# Patient Record
Sex: FEMALE | Race: WHITE | NOT HISPANIC OR LATINO | Employment: OTHER | URBAN - METROPOLITAN AREA
[De-identification: names, ages, dates, MRNs, and addresses within clinical notes are randomized per-mention and may not be internally consistent; named-entity substitution may affect disease eponyms.]

---

## 2023-11-16 ENCOUNTER — HOSPITAL ENCOUNTER (OUTPATIENT)
Dept: RADIOLOGY | Facility: HOSPITAL | Age: 78
Discharge: HOME | End: 2023-11-16
Payer: MEDICARE

## 2023-11-16 DIAGNOSIS — S83.231A COMPLEX TEAR OF MEDIAL MENISCUS, CURRENT INJURY, RIGHT KNEE, INITIAL ENCOUNTER: ICD-10-CM

## 2023-11-16 PROCEDURE — 73721 MRI JNT OF LWR EXTRE W/O DYE: CPT

## 2024-02-21 ENCOUNTER — PRE-ADMISSION TESTING (OUTPATIENT)
Dept: PREADMISSION TESTING | Facility: HOSPITAL | Age: 79
End: 2024-02-21
Payer: MEDICARE

## 2024-02-21 VITALS
HEART RATE: 75 BPM | TEMPERATURE: 96.8 F | OXYGEN SATURATION: 100 % | DIASTOLIC BLOOD PRESSURE: 86 MMHG | WEIGHT: 200 LBS | SYSTOLIC BLOOD PRESSURE: 133 MMHG | RESPIRATION RATE: 16 BRPM | HEIGHT: 67 IN | BODY MASS INDEX: 31.39 KG/M2

## 2024-02-21 DIAGNOSIS — Z01.818 PRE-OP TESTING: Primary | ICD-10-CM

## 2024-02-21 LAB
ANION GAP SERPL CALC-SCNC: 9 MMOL/L
APPEARANCE UR: CLEAR
BASOPHILS # BLD AUTO: 0.04 X10*3/UL (ref 0–0.1)
BASOPHILS NFR BLD AUTO: 0.5 %
BILIRUB UR STRIP.AUTO-MCNC: NEGATIVE MG/DL
BUN SERPL-MCNC: 23 MG/DL (ref 8–25)
CALCIUM SERPL-MCNC: 9.4 MG/DL (ref 8.5–10.4)
CHLORIDE SERPL-SCNC: 104 MMOL/L (ref 97–107)
CO2 SERPL-SCNC: 25 MMOL/L (ref 24–31)
COLOR UR: NORMAL
CREAT SERPL-MCNC: 1 MG/DL (ref 0.4–1.6)
EGFRCR SERPLBLD CKD-EPI 2021: 57 ML/MIN/1.73M*2
EOSINOPHIL # BLD AUTO: 0.17 X10*3/UL (ref 0–0.4)
EOSINOPHIL NFR BLD AUTO: 2.2 %
ERYTHROCYTE [DISTWIDTH] IN BLOOD BY AUTOMATED COUNT: 13.6 % (ref 11.5–14.5)
GLUCOSE SERPL-MCNC: 102 MG/DL (ref 65–99)
GLUCOSE UR STRIP.AUTO-MCNC: NORMAL MG/DL
HCT VFR BLD AUTO: 44.3 % (ref 36–46)
HGB BLD-MCNC: 14.4 G/DL (ref 12–16)
IMM GRANULOCYTES # BLD AUTO: 0.01 X10*3/UL (ref 0–0.5)
IMM GRANULOCYTES NFR BLD AUTO: 0.1 % (ref 0–0.9)
KETONES UR STRIP.AUTO-MCNC: NEGATIVE MG/DL
LEUKOCYTE ESTERASE UR QL STRIP.AUTO: NEGATIVE
LYMPHOCYTES # BLD AUTO: 3.22 X10*3/UL (ref 0.8–3)
LYMPHOCYTES NFR BLD AUTO: 41.8 %
MCH RBC QN AUTO: 28.1 PG (ref 26–34)
MCHC RBC AUTO-ENTMCNC: 32.5 G/DL (ref 32–36)
MCV RBC AUTO: 87 FL (ref 80–100)
MONOCYTES # BLD AUTO: 0.76 X10*3/UL (ref 0.05–0.8)
MONOCYTES NFR BLD AUTO: 9.9 %
NEUTROPHILS # BLD AUTO: 3.5 X10*3/UL (ref 1.6–5.5)
NEUTROPHILS NFR BLD AUTO: 45.5 %
NITRITE UR QL STRIP.AUTO: NEGATIVE
NRBC BLD-RTO: 0 /100 WBCS (ref 0–0)
PH UR STRIP.AUTO: 5 [PH]
PLATELET # BLD AUTO: 205 X10*3/UL (ref 150–450)
POTASSIUM SERPL-SCNC: 4.2 MMOL/L (ref 3.4–5.1)
PROT UR STRIP.AUTO-MCNC: NEGATIVE MG/DL
RBC # BLD AUTO: 5.12 X10*6/UL (ref 4–5.2)
RBC # UR STRIP.AUTO: NEGATIVE /UL
SODIUM SERPL-SCNC: 138 MMOL/L (ref 133–145)
SP GR UR STRIP.AUTO: 1.03
UROBILINOGEN UR STRIP.AUTO-MCNC: NORMAL MG/DL
WBC # BLD AUTO: 7.7 X10*3/UL (ref 4.4–11.3)

## 2024-02-21 PROCEDURE — 85025 COMPLETE CBC W/AUTO DIFF WBC: CPT | Performed by: PHYSICIAN ASSISTANT

## 2024-02-21 PROCEDURE — 80048 BASIC METABOLIC PNL TOTAL CA: CPT | Performed by: PHYSICIAN ASSISTANT

## 2024-02-21 PROCEDURE — 81003 URINALYSIS AUTO W/O SCOPE: CPT | Performed by: PHYSICIAN ASSISTANT

## 2024-02-21 PROCEDURE — 99204 OFFICE O/P NEW MOD 45 MIN: CPT | Performed by: PHYSICIAN ASSISTANT

## 2024-02-21 PROCEDURE — 36415 COLL VENOUS BLD VENIPUNCTURE: CPT

## 2024-02-21 PROCEDURE — 87081 CULTURE SCREEN ONLY: CPT | Mod: TRILAB,WESLAB | Performed by: PHYSICIAN ASSISTANT

## 2024-02-21 RX ORDER — CHLORHEXIDINE GLUCONATE ORAL RINSE 1.2 MG/ML
SOLUTION DENTAL
Qty: 473 ML | Refills: 0 | Status: SHIPPED | OUTPATIENT
Start: 2024-02-25 | End: 2024-02-27 | Stop reason: HOSPADM

## 2024-02-21 ASSESSMENT — DUKE ACTIVITY SCORE INDEX (DASI)
TOTAL_SCORE: 42.7
CAN YOU PARTICIPATE IN STRENOUS SPORTS LIKE SWIMMING, SINGLES TENNIS, FOOTBALL, BASKETBALL, OR SKIING: NO
CAN YOU WALK INDOORS, SUCH AS AROUND YOUR HOUSE: YES
CAN YOU TAKE CARE OF YOURSELF (EAT, DRESS, BATHE, OR USE TOILET): YES
CAN YOU DO HEAVY WORK AROUND THE HOUSE LIKE SCRUBBING FLOORS OR LIFTING AND MOVING HEAVY FURNITURE: NO
CAN YOU DO LIGHT WORK AROUND THE HOUSE LIKE DUSTING OR WASHING DISHES: YES
CAN YOU WALK A BLOCK OR TWO ON LEVEL GROUND: YES
CAN YOU HAVE SEXUAL RELATIONS: YES
CAN YOU CLIMB A FLIGHT OF STAIRS OR WALK UP A HILL: YES
CAN YOU PARTICIPATE IN MODERATE RECREATIONAL ACTIVITIES LIKE GOLF, BOWLING, DANCING, DOUBLES TENNIS OR THROWING A BASEBALL OR FOOTBALL: YES
CAN YOU DO MODERATE WORK AROUND THE HOUSE LIKE VACUUMING, SWEEPING FLOORS OR CARRYING GROCERIES: YES
DASI METS SCORE: 8
CAN YOU DO YARD WORK LIKE RAKING LEAVES, WEEDING OR PUSHING A MOWER: YES
CAN YOU RUN A SHORT DISTANCE: YES

## 2024-02-21 ASSESSMENT — CHADS2 SCORE
HYPERTENSION: NO
DIABETES: NO
CHADS2 SCORE: 1
AGE GREATER THAN OR EQUAL TO 75: YES
CHF: NO
PRIOR STROKE OR TIA OR THROMBOEMBOLISM: NO

## 2024-02-21 ASSESSMENT — ENCOUNTER SYMPTOMS
NEUROLOGICAL NEGATIVE: 1
CARDIOVASCULAR NEGATIVE: 1
SHORTNESS OF BREATH: 1
GASTROINTESTINAL NEGATIVE: 1
ENDOCRINE NEGATIVE: 1
CONSTITUTIONAL NEGATIVE: 1
EYES NEGATIVE: 1

## 2024-02-21 ASSESSMENT — PAIN - FUNCTIONAL ASSESSMENT: PAIN_FUNCTIONAL_ASSESSMENT: 0-10

## 2024-02-21 ASSESSMENT — PAIN DESCRIPTION - DESCRIPTORS: DESCRIPTORS: ACHING;SHARP

## 2024-02-21 ASSESSMENT — ACTIVITIES OF DAILY LIVING (ADL): EFFECT OF PAIN ON DAILY ACTIVITIES: DECREASES ACTIVITY TOLERANCE

## 2024-02-21 ASSESSMENT — PAIN SCALES - GENERAL: PAINLEVEL_OUTOF10: 5 - MODERATE PAIN

## 2024-02-21 NOTE — H&P (VIEW-ONLY)
"CPM/PAT Evaluation       Name: Johanna Fair (Johanna Fair)  /Age: 1945/78 y.o.     In-Person       Chief Complaint: \"knee pain\"    HPI  The patient is a 78 year old female.  More than 1 year ago she developed right knee pain with rest and exertion.  In  she underwent a right knee arthroscopy with medial meniscectomy.  She did well post operatively for several months.  In  she developed recurrent right knee pain occasionally while at rest and more pronounced with walking, standing and climbing stairs.  The pain radiates to the right lower leg.  She has associated right knee swelling, numbness, tingling, weakness and instability.  She has not had any falls.  She saw Dr. Alvarez in  and a cortisone injection at that time was not helpful.  A right total knee arthroplasty is recommended at this time.    Past Medical History:   Diagnosis Date    Arthritis     Dry eye     GERD (gastroesophageal reflux disease)     PONV (postoperative nausea and vomiting)        Past Surgical History:   Procedure Laterality Date    CATARACT EXTRACTION Bilateral     CHOLECYSTECTOMY      COLONOSCOPY      FOOT SURGERY Left     HIATAL HERNIA REPAIR      HYSTERECTOMY      KNEE ARTHROSCOPY W/ MENISCAL REPAIR Right     2023    UPPER GASTROINTESTINAL ENDOSCOPY       Social History     Tobacco Use    Smoking status: Never    Smokeless tobacco: Never   Substance Use Topics    Alcohol use: Yes     Comment: SELDOM/ 1 DRINK PER MONTH     Social History     Substance and Sexual Activity   Drug Use Never       Allergies   Allergen Reactions    Morphine Palpitations    Tramadol Hallucinations    Nubain [Nalbuphine] Other     sweating     Current Outpatient Medications   Medication Sig Dispense Refill    [START ON 2024] chlorhexidine (Peridex) 0.12 % solution Use as directed - patient may  prescription prior to 2024 Do not start before 2024. 473 mL 0     No current " "facility-administered medications for this visit.     Review of Systems   Constitutional: Negative.    HENT: Negative.     Eyes: Negative.    Respiratory:  Positive for shortness of breath (occasional).    Cardiovascular: Negative.    Gastrointestinal: Negative.    Endocrine: Negative.    Genitourinary: Negative.    Musculoskeletal:         See HPI   Neurological: Negative.      /86   Pulse 75   Temp 36 °C (96.8 °F) (Temporal)   Resp 16   Ht 1.702 m (5' 7\")   Wt 90.7 kg (200 lb)   SpO2 100%   BMI 31.32 kg/m²     Physical Exam  Vitals reviewed.   Constitutional:       Appearance: Normal appearance.   HENT:      Head: Normocephalic and atraumatic.      Mouth/Throat:      Mouth: Mucous membranes are moist.      Pharynx: Oropharynx is clear.   Eyes:      Extraocular Movements: Extraocular movements intact.      Pupils: Pupils are equal, round, and reactive to light.   Cardiovascular:      Rate and Rhythm: Normal rate and regular rhythm.      Heart sounds: Normal heart sounds.   Pulmonary:      Breath sounds: Normal breath sounds.   Abdominal:      General: Bowel sounds are normal.      Palpations: Abdomen is soft.   Musculoskeletal:      Comments: Mild edema and tenderness with palpation right knee.  Crepitus with limited range of motion right knee.   Skin:     General: Skin is warm and dry.   Neurological:      General: No focal deficit present.      Mental Status: She is alert and oriented to person, place, and time.   Psychiatric:         Mood and Affect: Mood normal.         Behavior: Behavior normal.        PAT AIRWAY:   Airway:     Mallampati::  III    TM distance::  >3 FB    Neck ROM::  Full   upper dentures and lower dentures    ASA:  I  DASI RISK SCORE:  42.7  METS SCORE:  8  CHAD2 SCORE:  2.8%  REVISED CARDIAC RISK INDEX:  0.4%  STOP BANG SCORE:   1    EKG 2/21/2024 - done at [facility] - scanned into MEDIA TAB    Assessment and Plan:     Primary osteoarthritis of right knee:  " Right total knee arthroplasty  PCP recommended cardiac clearance - patient saw Dr. Flora Campbell @ [facility] 2/21/2024 - cardiac clearance/Echocardiogram scanned under MEDIA TAB    Keisah Crum PA-C

## 2024-02-21 NOTE — PREPROCEDURE INSTRUCTIONS
PAT DISCHARGE INSTRUCTIONS    Please call the Same Day Surgery (SDS) Department of the hospital where your procedure will be performed after 2:00 PM the day before your surgery. If you are scheduled on a Monday, or a Tuesday following a Monday holiday, you will need to call on the last business day prior to your surgery.    [facility]  [address] [address] [facility] [address], [address]  [phone]    [facility]  [address] [place] [address] [phone]    [facility]  [address]  [place] [id number]  [phone]    Please let your surgeon know if:      You develop any open sores, shingles, burning or painful urination as these may increase your risk of an infection.   You no longer wish to have the surgery.   Any other personal circumstances change that may lead to the need to cancel or defer this surgery-such as being sick or getting admitted to any hospital within one week of your planned procedure.    Your contact details change, such as a change of address or phone number.    Starting now:     Please DO NOT drink alcohol or smoke for 24 hours before surgery. It is well known that quitting smoking can make a huge difference to your health and recovery from surgery. The longer you abstain from smoking, the better your chances of a healthy recovery. If you need help with quitting, call 9-800-QUIT-NOW to be connected to a trained counselor who will discuss the best methods to help you quit.     Before your surgery:    Please stop all supplements 7 days prior to surgery. Or as directed by your surgeon.   Please stop taking NSAID pain medicine such as Advil and Motrin 7 days before surgery.    If you develop any fever, cough, cold, rashes, cuts, scratches, scrapes, urinary symptoms or infection anywhere on your body (including teeth and gums) prior to surgery, please call your surgeon’s office as soon as  possible. This may require treatment to reduce the chance of cancellation on the day of surgery.    The day before your surgery:   DIET- Do not eat or drink anything after midnight the night before surgery, including mints, candy and gum.   Get a good night’s rest.  Use the special soap for bathing if you have been instructed to use one.    Scheduled surgery times may change and you will be notified if this occurs - please check your personal voicemail for any updates.     On the morning of surgery:   Wear comfortable, loose fitting clothes which open in the front. Please do not wear moisturizers, creams, lotions, makeup or perfume.    Please bring with you to surgery:   Photo ID and insurance card   Current list of medicines and allergies   Pacemaker/ Defibrillator/Heart stent cards   CPAP machine and mask    Slings/ splints/ crutches   A copy of your complete advanced directive/DHPOA.    Please do NOT bring with you to surgery:   All jewelry and valuables should be left at home.   Prosthetic devices such as contact lenses, hearing aids, dentures, eyelash extensions, hairpins and body piercings must be removed prior to going in to the surgical suite.    After outpatient surgery:   A responsible adult MUST accompany you at the time of discharge and stay with you for 24 hours after your surgery. You may NOT drive yourself home after surgery.    Do not drive, operate machinery, make critical decisions or do activities that require co-ordination or balance until after a night’s sleep.   Do not drink alcoholic beverages for 24 hours.   Instructions for resuming your medications will be provided by your surgeon.    CALL YOUR DOCTOR AFTER SURGERY IF YOU HAVE:     Chills and/or a fever of 101° F or higher.    Redness, swelling, pus or drainage from your surgical wound or a bad smell from the wound.    Lightheadedness, fainting or confusion.    Persistent vomiting (throwing up) and are not able to eat or drink for 12 hours.     Three or more loose, watery bowel movements in 24 hours (diarrhea).   Difficulty or pain while urinating( after non-urological surgery)    Pain and swelling in your legs, especially if it is only on one side.    Difficulty breathing or are breathing faster than normal.    Any new concerning symptoms.     Home Preoperative Antibacterial Shower    What is a home preoperative antibacterial shower?  This shower is a way of cleaning the skin with a germ killing solution before surgery. The solution contains chlorhexidine, commonly known as CHG. CHG is a skin cleanser with germ killing ability. Let your doctor know if you are allergic to chlorhexidine.      Why do I need to take a preoperative antibacterial shower?  Skin is not sterile. It is best to try to make your skin as free of germs as possible before surgery. Proper cleansing with a germ killing soap before surgery can lower the number of germs on your skin. This helps to reduce the risk of infection at the surgical site. Following the instructions listed below will help you prepare your skin for surgery.    How do I use the solution?      Steps: Begin using your CHG soap FIVE DAYS BEFORE your scheduled surgery on __________________________________________________.  First, wash and rinse your hair using the CHG soap. Keep CHG away soap away from ear canals and eyes.  Rinse completely, do not condition. Hair extensions should be removed.  Wash your face with your normal soap and rinse.  Apply the CHG solution to a clean wet washcloth. Turn the water off or move away from the water spray to avoid premature rinsing of the CHG soap as you are applying.  Firmly lather your entire body from neck down. Do not use on face.  Pay special attention to the areas(s) where your incision(s) will be located unless they are on your face.  Avoid scrubbing your skin too hard.  The important point is to have the CHG soap sit on your skin for 3 minutes.  DO NOT wash with regular  soap after you have used the CHG soap solution.  Pat yourself dry with a clean, freshly laundered towel.  DO NOT apply powders, deodorants or lotions.  Dress in clean, freshly laundered night clothes.  Be sure to sleep with clean, freshly laundered sheets.  Be aware that CHG will cause stains on fabrics; if you wash them with bleach after use. Rinse your washcloth and other linens that have contact with CHG completely. Use only non-chlorine detergents to launder the items used.  The morning of surgery is the fifth day. Repeat the above steps and dress in clean comfortable clothing.      Who should I call if I have any questions regarding the use of CHG soap?  Call the [facility] at [phone] if you have any questions.       Patient Information: Oral/Dental Rinse    What is oral/dental rinse?  It is a mouthwash. It is a way of cleaning the mouth with a germ killing solution before your surgery. The solution contains chlorhexidine, commonly know as CHG.  It is used inside the mouth to kill bacteria known as Staphylococcus aureus.  Let your doctor know if you are allergic to chlorhexidine.    Why do I need to use CHG oral/dental rinse?  The CHG oral/dental rinse helps to kill bacteria in your mouth known as Staphylococcus aureus. This reduces the risk of infection at the surgical site.    Using your CHG oral/dental rinse.  STEPS: use your CHG oral/dental rinse after you brush your teeth the night before (at bedtime) and the morning of your surgery. Follow the directions on your prescription label.  Use the cap on the container to measure 15ml (fill cap to fill line)  Swish (gargle if you can) the mouthwash in your mouth for at least 30 seconds, (do not swallow) spit out.  After you use your CHG rinse, do not rinse your mouth with water, drink or eat. Please refer to prescription label for the appropriate time to resume oral intake.    What side  effects might I have using the CHG oral/dental rinse?  CHG rinse will stick to the plaque on the teeth. Brush and floss just before use. Teeth brushing will help avoid staining of plaque during use.    Who should I contact if I have questions about the CHG oral/dental rinse?  Please call [facility] at [phone] if you have any questions.     Medication List      as of February 21, 2024  7:35 AM     You have not been prescribed any medications.

## 2024-02-21 NOTE — CPM/PAT H&P
"CPM/PAT Evaluation       Name: Johanna Fair (Johanna Fair)  /Age: 1945/78 y.o.     In-Person       Chief Complaint: \"knee pain\"    HPI  The patient is a 78 year old female.  More than 1 year ago she developed right knee pain with rest and exertion.  In  she underwent a right knee arthroscopy with medial meniscectomy.  She did well post operatively for several months.  In  she developed recurrent right knee pain occasionally while at rest and more pronounced with walking, standing and climbing stairs.  The pain radiates to the right lower leg.  She has associated right knee swelling, numbness, tingling, weakness and instability.  She has not had any falls.  She saw Dr. Alvarez in  and a cortisone injection at that time was not helpful.  A right total knee arthroplasty is recommended at this time.    Past Medical History:   Diagnosis Date    Arthritis     Dry eye     GERD (gastroesophageal reflux disease)     PONV (postoperative nausea and vomiting)        Past Surgical History:   Procedure Laterality Date    CATARACT EXTRACTION Bilateral     CHOLECYSTECTOMY      COLONOSCOPY      FOOT SURGERY Left     HIATAL HERNIA REPAIR      HYSTERECTOMY      KNEE ARTHROSCOPY W/ MENISCAL REPAIR Right     2023    UPPER GASTROINTESTINAL ENDOSCOPY       Social History     Tobacco Use    Smoking status: Never    Smokeless tobacco: Never   Substance Use Topics    Alcohol use: Yes     Comment: SELDOM/ 1 DRINK PER MONTH     Social History     Substance and Sexual Activity   Drug Use Never       Allergies   Allergen Reactions    Morphine Palpitations    Tramadol Hallucinations    Nubain [Nalbuphine] Other     sweating     Current Outpatient Medications   Medication Sig Dispense Refill    [START ON 2024] chlorhexidine (Peridex) 0.12 % solution Use as directed - patient may  prescription prior to 2024 Do not start before 2024. 473 mL 0     No current " "facility-administered medications for this visit.     Review of Systems   Constitutional: Negative.    HENT: Negative.     Eyes: Negative.    Respiratory:  Positive for shortness of breath (occasional).    Cardiovascular: Negative.    Gastrointestinal: Negative.    Endocrine: Negative.    Genitourinary: Negative.    Musculoskeletal:         See HPI   Neurological: Negative.      /86   Pulse 75   Temp 36 °C (96.8 °F) (Temporal)   Resp 16   Ht 1.702 m (5' 7\")   Wt 90.7 kg (200 lb)   SpO2 100%   BMI 31.32 kg/m²     Physical Exam  Vitals reviewed.   Constitutional:       Appearance: Normal appearance.   HENT:      Head: Normocephalic and atraumatic.      Mouth/Throat:      Mouth: Mucous membranes are moist.      Pharynx: Oropharynx is clear.   Eyes:      Extraocular Movements: Extraocular movements intact.      Pupils: Pupils are equal, round, and reactive to light.   Cardiovascular:      Rate and Rhythm: Normal rate and regular rhythm.      Heart sounds: Normal heart sounds.   Pulmonary:      Breath sounds: Normal breath sounds.   Abdominal:      General: Bowel sounds are normal.      Palpations: Abdomen is soft.   Musculoskeletal:      Comments: Mild edema and tenderness with palpation right knee.  Crepitus with limited range of motion right knee.   Skin:     General: Skin is warm and dry.   Neurological:      General: No focal deficit present.      Mental Status: She is alert and oriented to person, place, and time.   Psychiatric:         Mood and Affect: Mood normal.         Behavior: Behavior normal.        PAT AIRWAY:   Airway:     Mallampati::  III    TM distance::  >3 FB    Neck ROM::  Full   upper dentures and lower dentures    ASA:  I  DASI RISK SCORE:  42.7  METS SCORE:  8  CHAD2 SCORE:  2.8%  REVISED CARDIAC RISK INDEX:  0.4%  STOP BANG SCORE:   1    EKG 2/21/2024 - done at [facility] - scanned into MEDIA TAB    Assessment and Plan:     Primary osteoarthritis of right knee:  " Right total knee arthroplasty  PCP recommended cardiac clearance - patient saw Dr. Flora Campbell @ [facility] 2/21/2024 - cardiac clearance/Echocardiogram scanned under MEDIA TAB    Keisha Crum PA-C

## 2024-02-23 ENCOUNTER — ANESTHESIA EVENT (OUTPATIENT)
Dept: OPERATING ROOM | Facility: HOSPITAL | Age: 79
End: 2024-02-23
Payer: MEDICARE

## 2024-02-23 PROBLEM — Z96.651 S/P TOTAL KNEE ARTHROPLASTY, RIGHT: Status: ACTIVE | Noted: 2024-02-23

## 2024-02-23 LAB — STAPHYLOCOCCUS SPEC CULT: NORMAL

## 2024-02-26 ENCOUNTER — HOSPITAL ENCOUNTER (OUTPATIENT)
Facility: HOSPITAL | Age: 79
LOS: 1 days | Discharge: HOME | End: 2024-02-27
Attending: STUDENT IN AN ORGANIZED HEALTH CARE EDUCATION/TRAINING PROGRAM | Admitting: STUDENT IN AN ORGANIZED HEALTH CARE EDUCATION/TRAINING PROGRAM
Payer: MEDICARE

## 2024-02-26 ENCOUNTER — ANESTHESIA (OUTPATIENT)
Dept: OPERATING ROOM | Facility: HOSPITAL | Age: 79
End: 2024-02-26
Payer: MEDICARE

## 2024-02-26 ENCOUNTER — APPOINTMENT (OUTPATIENT)
Dept: RADIOLOGY | Facility: HOSPITAL | Age: 79
End: 2024-02-26
Payer: MEDICARE

## 2024-02-26 DIAGNOSIS — Z96.651 S/P TOTAL KNEE ARTHROPLASTY, RIGHT: Primary | ICD-10-CM

## 2024-02-26 PROBLEM — K21.9 GASTROESOPHAGEAL REFLUX DISEASE WITHOUT ESOPHAGITIS: Status: ACTIVE | Noted: 2024-02-26

## 2024-02-26 PROBLEM — M17.9 OSTEOARTHRITIS OF KNEE: Status: ACTIVE | Noted: 2024-02-26

## 2024-02-26 PROCEDURE — 2500000004 HC RX 250 GENERAL PHARMACY W/ HCPCS (ALT 636 FOR OP/ED): Performed by: ANESTHESIOLOGY

## 2024-02-26 PROCEDURE — 2500000005 HC RX 250 GENERAL PHARMACY W/O HCPCS: Performed by: ANESTHESIOLOGY

## 2024-02-26 PROCEDURE — 3700000001 HC GENERAL ANESTHESIA TIME - INITIAL BASE CHARGE: Performed by: STUDENT IN AN ORGANIZED HEALTH CARE EDUCATION/TRAINING PROGRAM

## 2024-02-26 PROCEDURE — 2500000005 HC RX 250 GENERAL PHARMACY W/O HCPCS: Performed by: STUDENT IN AN ORGANIZED HEALTH CARE EDUCATION/TRAINING PROGRAM

## 2024-02-26 PROCEDURE — A4649 SURGICAL SUPPLIES: HCPCS | Performed by: STUDENT IN AN ORGANIZED HEALTH CARE EDUCATION/TRAINING PROGRAM

## 2024-02-26 PROCEDURE — C9113 INJ PANTOPRAZOLE SODIUM, VIA: HCPCS | Performed by: NURSE PRACTITIONER

## 2024-02-26 PROCEDURE — 2500000001 HC RX 250 WO HCPCS SELF ADMINISTERED DRUGS (ALT 637 FOR MEDICARE OP): Performed by: STUDENT IN AN ORGANIZED HEALTH CARE EDUCATION/TRAINING PROGRAM

## 2024-02-26 PROCEDURE — C1776 JOINT DEVICE (IMPLANTABLE): HCPCS | Performed by: STUDENT IN AN ORGANIZED HEALTH CARE EDUCATION/TRAINING PROGRAM

## 2024-02-26 PROCEDURE — 97116 GAIT TRAINING THERAPY: CPT | Mod: GP

## 2024-02-26 PROCEDURE — 2500000004 HC RX 250 GENERAL PHARMACY W/ HCPCS (ALT 636 FOR OP/ED): Performed by: NURSE PRACTITIONER

## 2024-02-26 PROCEDURE — 7100000011 HC EXTENDED STAY RECOVERY HOURLY - NURSING UNIT

## 2024-02-26 PROCEDURE — 2500000004 HC RX 250 GENERAL PHARMACY W/ HCPCS (ALT 636 FOR OP/ED): Performed by: STUDENT IN AN ORGANIZED HEALTH CARE EDUCATION/TRAINING PROGRAM

## 2024-02-26 PROCEDURE — 7100000002 HC RECOVERY ROOM TIME - EACH INCREMENTAL 1 MINUTE: Performed by: STUDENT IN AN ORGANIZED HEALTH CARE EDUCATION/TRAINING PROGRAM

## 2024-02-26 PROCEDURE — 2780000003 HC OR 278 NO HCPCS: Performed by: STUDENT IN AN ORGANIZED HEALTH CARE EDUCATION/TRAINING PROGRAM

## 2024-02-26 PROCEDURE — 2720000007 HC OR 272 NO HCPCS: Performed by: STUDENT IN AN ORGANIZED HEALTH CARE EDUCATION/TRAINING PROGRAM

## 2024-02-26 PROCEDURE — 3700000002 HC GENERAL ANESTHESIA TIME - EACH INCREMENTAL 1 MINUTE: Performed by: STUDENT IN AN ORGANIZED HEALTH CARE EDUCATION/TRAINING PROGRAM

## 2024-02-26 PROCEDURE — C1713 ANCHOR/SCREW BN/BN,TIS/BN: HCPCS | Performed by: STUDENT IN AN ORGANIZED HEALTH CARE EDUCATION/TRAINING PROGRAM

## 2024-02-26 PROCEDURE — 3600000017 HC OR TIME - EACH INCREMENTAL 1 MINUTE - PROCEDURE LEVEL SIX: Performed by: STUDENT IN AN ORGANIZED HEALTH CARE EDUCATION/TRAINING PROGRAM

## 2024-02-26 PROCEDURE — 97166 OT EVAL MOD COMPLEX 45 MIN: CPT | Mod: GO

## 2024-02-26 PROCEDURE — 97162 PT EVAL MOD COMPLEX 30 MIN: CPT | Mod: GP

## 2024-02-26 PROCEDURE — 7100000001 HC RECOVERY ROOM TIME - INITIAL BASE CHARGE: Performed by: STUDENT IN AN ORGANIZED HEALTH CARE EDUCATION/TRAINING PROGRAM

## 2024-02-26 PROCEDURE — 3600000018 HC OR TIME - INITIAL BASE CHARGE - PROCEDURE LEVEL SIX: Performed by: STUDENT IN AN ORGANIZED HEALTH CARE EDUCATION/TRAINING PROGRAM

## 2024-02-26 PROCEDURE — 73560 X-RAY EXAM OF KNEE 1 OR 2: CPT | Mod: RT

## 2024-02-26 DEVICE — SIMPLEX® HV IS A FAST-SETTING ACRYLIC RESIN FOR USE IN BONE SURGERY. MIXING THE TWO SEPARATE STERILE COMPONENTS PRODUCES A DUCTILE BONE CEMENT WHICH, AFTER HARDENING, FIXES THE IMPLANT AND TRANSFERS STRESSES PRODUCED DURING MOVEMENT EVENLY TO THE BONE. SIMPLEX® HV CEMENT POWDER ALSO CONTAINS INSOLUBLE ZIRCONIUM DIOXIDE AS AN X-RAY CONTRAST MEDIUM. SIMPLEX® HV DOES NOT EMIT A SIGNAL AND DOES NOT POSE A SAFETY RISK IN A MAGNETIC RESONANCE ENVIRONMENT.
Type: IMPLANTABLE DEVICE | Site: KNEE | Status: FUNCTIONAL
Brand: SIMPLEX HV

## 2024-02-26 DEVICE — ASYMMETRIC PATELLA
Type: IMPLANTABLE DEVICE | Site: KNEE | Status: FUNCTIONAL
Brand: TRIATHLON

## 2024-02-26 DEVICE — POSTERIOR STABILIZED FEMORAL
Type: IMPLANTABLE DEVICE | Site: KNEE | Status: FUNCTIONAL
Brand: TRIATHLON

## 2024-02-26 DEVICE — UNIVERSAL TIBIAL BASEPLATE
Type: IMPLANTABLE DEVICE | Site: KNEE | Status: FUNCTIONAL
Brand: TRIATHLON

## 2024-02-26 DEVICE — FEMORAL DISTAL FIXATION PEG
Type: IMPLANTABLE DEVICE | Site: KNEE | Status: FUNCTIONAL
Brand: TRIATHLON

## 2024-02-26 DEVICE — IMPLANTABLE DEVICE: Type: IMPLANTABLE DEVICE | Site: KNEE | Status: FUNCTIONAL

## 2024-02-26 RX ORDER — DEXAMETHASONE SODIUM PHOSPHATE 4 MG/ML
INJECTION, SOLUTION INTRA-ARTICULAR; INTRALESIONAL; INTRAMUSCULAR; INTRAVENOUS; SOFT TISSUE AS NEEDED
Status: DISCONTINUED | OUTPATIENT
Start: 2024-02-26 | End: 2024-02-26

## 2024-02-26 RX ORDER — LIDOCAINE HYDROCHLORIDE 10 MG/ML
0.1 INJECTION INFILTRATION; PERINEURAL ONCE
Status: DISCONTINUED | OUTPATIENT
Start: 2024-02-26 | End: 2024-02-26 | Stop reason: HOSPADM

## 2024-02-26 RX ORDER — LIDOCAINE HYDROCHLORIDE 20 MG/ML
INJECTION, SOLUTION INFILTRATION; PERINEURAL AS NEEDED
Status: DISCONTINUED | OUTPATIENT
Start: 2024-02-26 | End: 2024-02-26

## 2024-02-26 RX ORDER — ONDANSETRON HYDROCHLORIDE 2 MG/ML
4 INJECTION, SOLUTION INTRAVENOUS ONCE AS NEEDED
Status: DISCONTINUED | OUTPATIENT
Start: 2024-02-26 | End: 2024-02-26 | Stop reason: HOSPADM

## 2024-02-26 RX ORDER — HYDROCODONE BITARTRATE AND ACETAMINOPHEN 5; 325 MG/1; MG/1
2 TABLET ORAL EVERY 4 HOURS PRN
Status: DISCONTINUED | OUTPATIENT
Start: 2024-02-26 | End: 2024-02-27 | Stop reason: HOSPADM

## 2024-02-26 RX ORDER — NALOXONE HYDROCHLORIDE 0.4 MG/ML
0.2 INJECTION, SOLUTION INTRAMUSCULAR; INTRAVENOUS; SUBCUTANEOUS EVERY 5 MIN PRN
Status: DISCONTINUED | OUTPATIENT
Start: 2024-02-26 | End: 2024-02-27 | Stop reason: HOSPADM

## 2024-02-26 RX ORDER — ALBUTEROL SULFATE 0.83 MG/ML
2.5 SOLUTION RESPIRATORY (INHALATION) ONCE AS NEEDED
Status: DISCONTINUED | OUTPATIENT
Start: 2024-02-26 | End: 2024-02-26 | Stop reason: HOSPADM

## 2024-02-26 RX ORDER — HYDROMORPHONE HYDROCHLORIDE 1 MG/ML
INJECTION, SOLUTION INTRAMUSCULAR; INTRAVENOUS; SUBCUTANEOUS AS NEEDED
Status: DISCONTINUED | OUTPATIENT
Start: 2024-02-26 | End: 2024-02-26

## 2024-02-26 RX ORDER — CEFAZOLIN SODIUM 2 G/100ML
2 INJECTION, SOLUTION INTRAVENOUS EVERY 8 HOURS
Status: COMPLETED | OUTPATIENT
Start: 2024-02-26 | End: 2024-02-27

## 2024-02-26 RX ORDER — ASPIRIN 81 MG/1
81 TABLET ORAL 2 TIMES DAILY
Status: DISCONTINUED | OUTPATIENT
Start: 2024-02-26 | End: 2024-02-27 | Stop reason: HOSPADM

## 2024-02-26 RX ORDER — POLYETHYLENE GLYCOL 3350 17 G/17G
17 POWDER, FOR SOLUTION ORAL DAILY
Status: DISCONTINUED | OUTPATIENT
Start: 2024-02-26 | End: 2024-02-27 | Stop reason: HOSPADM

## 2024-02-26 RX ORDER — FENTANYL CITRATE 50 UG/ML
INJECTION, SOLUTION INTRAMUSCULAR; INTRAVENOUS AS NEEDED
Status: DISCONTINUED | OUTPATIENT
Start: 2024-02-26 | End: 2024-02-26

## 2024-02-26 RX ORDER — METOCLOPRAMIDE HYDROCHLORIDE 5 MG/ML
INJECTION INTRAMUSCULAR; INTRAVENOUS AS NEEDED
Status: DISCONTINUED | OUTPATIENT
Start: 2024-02-26 | End: 2024-02-26

## 2024-02-26 RX ORDER — SODIUM CHLORIDE, SODIUM LACTATE, POTASSIUM CHLORIDE, CALCIUM CHLORIDE 600; 310; 30; 20 MG/100ML; MG/100ML; MG/100ML; MG/100ML
100 INJECTION, SOLUTION INTRAVENOUS CONTINUOUS
Status: DISCONTINUED | OUTPATIENT
Start: 2024-02-26 | End: 2024-02-27 | Stop reason: HOSPADM

## 2024-02-26 RX ORDER — TRANEXAMIC ACID 10 MG/ML
INJECTION, SOLUTION INTRAVENOUS AS NEEDED
Status: DISCONTINUED | OUTPATIENT
Start: 2024-02-26 | End: 2024-02-26

## 2024-02-26 RX ORDER — SODIUM CHLORIDE, SODIUM LACTATE, POTASSIUM CHLORIDE, CALCIUM CHLORIDE 600; 310; 30; 20 MG/100ML; MG/100ML; MG/100ML; MG/100ML
100 INJECTION, SOLUTION INTRAVENOUS CONTINUOUS
Status: DISCONTINUED | OUTPATIENT
Start: 2024-02-26 | End: 2024-02-26 | Stop reason: HOSPADM

## 2024-02-26 RX ORDER — SCOLOPAMINE TRANSDERMAL SYSTEM 1 MG/1
1 PATCH, EXTENDED RELEASE TRANSDERMAL ONCE
Status: DISCONTINUED | OUTPATIENT
Start: 2024-02-26 | End: 2024-02-27 | Stop reason: HOSPADM

## 2024-02-26 RX ORDER — CEFAZOLIN SODIUM 2 G/100ML
2 INJECTION, SOLUTION INTRAVENOUS ONCE
Status: COMPLETED | OUTPATIENT
Start: 2024-02-26 | End: 2024-02-26

## 2024-02-26 RX ORDER — PROPOFOL 10 MG/ML
INJECTION, EMULSION INTRAVENOUS AS NEEDED
Status: DISCONTINUED | OUTPATIENT
Start: 2024-02-26 | End: 2024-02-26

## 2024-02-26 RX ORDER — ONDANSETRON HYDROCHLORIDE 2 MG/ML
INJECTION, SOLUTION INTRAVENOUS AS NEEDED
Status: DISCONTINUED | OUTPATIENT
Start: 2024-02-26 | End: 2024-02-26

## 2024-02-26 RX ORDER — VANCOMYCIN HYDROCHLORIDE 1 G/20ML
INJECTION, POWDER, LYOPHILIZED, FOR SOLUTION INTRAVENOUS AS NEEDED
Status: DISCONTINUED | OUTPATIENT
Start: 2024-02-26 | End: 2024-02-26 | Stop reason: HOSPADM

## 2024-02-26 RX ORDER — PANTOPRAZOLE SODIUM 40 MG/10ML
40 INJECTION, POWDER, LYOPHILIZED, FOR SOLUTION INTRAVENOUS DAILY
Status: DISCONTINUED | OUTPATIENT
Start: 2024-02-26 | End: 2024-02-27 | Stop reason: HOSPADM

## 2024-02-26 RX ORDER — HYDRALAZINE HYDROCHLORIDE 20 MG/ML
5 INJECTION INTRAMUSCULAR; INTRAVENOUS EVERY 30 MIN PRN
Status: DISCONTINUED | OUTPATIENT
Start: 2024-02-26 | End: 2024-02-26 | Stop reason: HOSPADM

## 2024-02-26 RX ORDER — SODIUM CHLORIDE 9 MG/ML
100 INJECTION, SOLUTION INTRAVENOUS CONTINUOUS
Status: DISCONTINUED | OUTPATIENT
Start: 2024-02-26 | End: 2024-02-27 | Stop reason: HOSPADM

## 2024-02-26 RX ORDER — ACETAMINOPHEN 325 MG/1
650 TABLET ORAL EVERY 4 HOURS PRN
Status: DISCONTINUED | OUTPATIENT
Start: 2024-02-26 | End: 2024-02-27 | Stop reason: HOSPADM

## 2024-02-26 RX ORDER — ONDANSETRON HYDROCHLORIDE 2 MG/ML
4 INJECTION, SOLUTION INTRAVENOUS EVERY 8 HOURS PRN
Status: DISCONTINUED | OUTPATIENT
Start: 2024-02-26 | End: 2024-02-27 | Stop reason: HOSPADM

## 2024-02-26 RX ORDER — ONDANSETRON 4 MG/1
4 TABLET, ORALLY DISINTEGRATING ORAL EVERY 8 HOURS PRN
Status: DISCONTINUED | OUTPATIENT
Start: 2024-02-26 | End: 2024-02-27 | Stop reason: HOSPADM

## 2024-02-26 RX ORDER — HYDROCODONE BITARTRATE AND ACETAMINOPHEN 5; 325 MG/1; MG/1
1 TABLET ORAL EVERY 4 HOURS PRN
Status: DISCONTINUED | OUTPATIENT
Start: 2024-02-26 | End: 2024-02-27 | Stop reason: HOSPADM

## 2024-02-26 RX ADMIN — ONDANSETRON HYDROCHLORIDE 4 MG: 2 INJECTION INTRAMUSCULAR; INTRAVENOUS at 12:22

## 2024-02-26 RX ADMIN — PROPOFOL 25 MG: 10 INJECTION, EMULSION INTRAVENOUS at 11:21

## 2024-02-26 RX ADMIN — DEXAMETHASONE SODIUM PHOSPHATE 4 MG: 4 INJECTION, SOLUTION INTRA-ARTICULAR; INTRALESIONAL; INTRAMUSCULAR; INTRAVENOUS; SOFT TISSUE at 11:01

## 2024-02-26 RX ADMIN — HYDROMORPHONE HYDROCHLORIDE 0.2 MG: 1 INJECTION, SOLUTION INTRAMUSCULAR; INTRAVENOUS; SUBCUTANEOUS at 13:06

## 2024-02-26 RX ADMIN — HYDROMORPHONE HYDROCHLORIDE 0.2 MG: 1 INJECTION, SOLUTION INTRAMUSCULAR; INTRAVENOUS; SUBCUTANEOUS at 13:13

## 2024-02-26 RX ADMIN — FENTANYL CITRATE 25 MCG: 0.05 INJECTION, SOLUTION INTRAMUSCULAR; INTRAVENOUS at 11:25

## 2024-02-26 RX ADMIN — LIDOCAINE HYDROCHLORIDE 50 MG: 20 INJECTION, SOLUTION INFILTRATION; PERINEURAL at 10:52

## 2024-02-26 RX ADMIN — SODIUM CHLORIDE 100 ML/HR: 900 INJECTION, SOLUTION INTRAVENOUS at 16:11

## 2024-02-26 RX ADMIN — PROPOFOL 25 MG: 10 INJECTION, EMULSION INTRAVENOUS at 12:48

## 2024-02-26 RX ADMIN — Medication 2 L/MIN: at 16:00

## 2024-02-26 RX ADMIN — HYDROCODONE BITARTRATE AND ACETAMINOPHEN 2 TABLET: 5; 325 TABLET ORAL at 17:36

## 2024-02-26 RX ADMIN — SCOPALAMINE 1 PATCH: 1 PATCH, EXTENDED RELEASE TRANSDERMAL at 09:49

## 2024-02-26 RX ADMIN — CEFAZOLIN SODIUM 2 G: 2 INJECTION, SOLUTION INTRAVENOUS at 17:37

## 2024-02-26 RX ADMIN — PANTOPRAZOLE SODIUM 40 MG: 40 INJECTION, POWDER, FOR SOLUTION INTRAVENOUS at 17:37

## 2024-02-26 RX ADMIN — CEFAZOLIN SODIUM 2 G: 2 INJECTION, SOLUTION INTRAVENOUS at 10:43

## 2024-02-26 RX ADMIN — PROPOFOL 25 MG: 10 INJECTION, EMULSION INTRAVENOUS at 12:04

## 2024-02-26 RX ADMIN — FENTANYL CITRATE 50 MCG: 0.05 INJECTION, SOLUTION INTRAMUSCULAR; INTRAVENOUS at 10:52

## 2024-02-26 RX ADMIN — SODIUM CHLORIDE, POTASSIUM CHLORIDE, SODIUM LACTATE AND CALCIUM CHLORIDE: 600; 310; 30; 20 INJECTION, SOLUTION INTRAVENOUS at 10:43

## 2024-02-26 RX ADMIN — PROPOFOL 25 MG: 10 INJECTION, EMULSION INTRAVENOUS at 11:51

## 2024-02-26 RX ADMIN — PROPOFOL 25 MG: 10 INJECTION, EMULSION INTRAVENOUS at 11:34

## 2024-02-26 RX ADMIN — TRANEXAMIC ACID 1000 MG: 10 INJECTION, SOLUTION INTRAVENOUS at 12:19

## 2024-02-26 RX ADMIN — ASPIRIN 81 MG: 81 TABLET, COATED ORAL at 22:21

## 2024-02-26 RX ADMIN — FENTANYL CITRATE 50 MCG: 0.05 INJECTION, SOLUTION INTRAMUSCULAR; INTRAVENOUS at 10:44

## 2024-02-26 RX ADMIN — HYDROCODONE BITARTRATE AND ACETAMINOPHEN 2 TABLET: 5; 325 TABLET ORAL at 22:21

## 2024-02-26 RX ADMIN — HYDROMORPHONE HYDROCHLORIDE 0.4 MG: 1 INJECTION, SOLUTION INTRAMUSCULAR; INTRAVENOUS; SUBCUTANEOUS at 13:16

## 2024-02-26 RX ADMIN — PROPOFOL 25 MG: 10 INJECTION, EMULSION INTRAVENOUS at 12:32

## 2024-02-26 RX ADMIN — PROPOFOL 25 MG: 10 INJECTION, EMULSION INTRAVENOUS at 11:25

## 2024-02-26 RX ADMIN — PROPOFOL 50 MG: 10 INJECTION, EMULSION INTRAVENOUS at 11:04

## 2024-02-26 RX ADMIN — HYDROMORPHONE HYDROCHLORIDE 0.4 MG: 1 INJECTION, SOLUTION INTRAMUSCULAR; INTRAVENOUS; SUBCUTANEOUS at 13:41

## 2024-02-26 RX ADMIN — POLYETHYLENE GLYCOL 3350 17 G: 17 POWDER, FOR SOLUTION ORAL at 16:11

## 2024-02-26 RX ADMIN — PROPOFOL 25 MG: 10 INJECTION, EMULSION INTRAVENOUS at 12:17

## 2024-02-26 RX ADMIN — HYDROMORPHONE HYDROCHLORIDE 0.2 MG: 1 INJECTION, SOLUTION INTRAMUSCULAR; INTRAVENOUS; SUBCUTANEOUS at 13:05

## 2024-02-26 RX ADMIN — PROPOFOL 150 MG: 10 INJECTION, EMULSION INTRAVENOUS at 10:52

## 2024-02-26 RX ADMIN — FENTANYL CITRATE 25 MCG: 0.05 INJECTION, SOLUTION INTRAMUSCULAR; INTRAVENOUS at 12:24

## 2024-02-26 RX ADMIN — HYDROMORPHONE HYDROCHLORIDE 0.4 MG: 1 INJECTION, SOLUTION INTRAMUSCULAR; INTRAVENOUS; SUBCUTANEOUS at 13:59

## 2024-02-26 RX ADMIN — TRANEXAMIC ACID 1000 MG: 10 INJECTION, SOLUTION INTRAVENOUS at 11:00

## 2024-02-26 RX ADMIN — METOCLOPRAMIDE 10 MG: 5 INJECTION, SOLUTION INTRAMUSCULAR; INTRAVENOUS at 11:01

## 2024-02-26 RX ADMIN — FENTANYL CITRATE 50 MCG: 0.05 INJECTION, SOLUTION INTRAMUSCULAR; INTRAVENOUS at 11:04

## 2024-02-26 SDOH — SOCIAL STABILITY: SOCIAL INSECURITY: ARE YOU OR HAVE YOU BEEN THREATENED OR ABUSED PHYSICALLY, EMOTIONALLY, OR SEXUALLY BY ANYONE?: NO

## 2024-02-26 SDOH — SOCIAL STABILITY: SOCIAL INSECURITY: DO YOU FEEL ANYONE HAS EXPLOITED OR TAKEN ADVANTAGE OF YOU FINANCIALLY OR OF YOUR PERSONAL PROPERTY?: NO

## 2024-02-26 SDOH — SOCIAL STABILITY: SOCIAL INSECURITY: WERE YOU ABLE TO COMPLETE ALL THE BEHAVIORAL HEALTH SCREENINGS?: YES

## 2024-02-26 SDOH — SOCIAL STABILITY: SOCIAL INSECURITY: HAS ANYONE EVER THREATENED TO HURT YOUR FAMILY OR YOUR PETS?: NO

## 2024-02-26 SDOH — SOCIAL STABILITY: SOCIAL INSECURITY: ARE THERE ANY APPARENT SIGNS OF INJURIES/BEHAVIORS THAT COULD BE RELATED TO ABUSE/NEGLECT?: NO

## 2024-02-26 SDOH — SOCIAL STABILITY: SOCIAL INSECURITY: HAVE YOU HAD THOUGHTS OF HARMING ANYONE ELSE?: NO

## 2024-02-26 SDOH — SOCIAL STABILITY: SOCIAL INSECURITY: DO YOU FEEL UNSAFE GOING BACK TO THE PLACE WHERE YOU ARE LIVING?: NO

## 2024-02-26 SDOH — HEALTH STABILITY: MENTAL HEALTH: CURRENT SMOKER: 0

## 2024-02-26 SDOH — SOCIAL STABILITY: SOCIAL INSECURITY: DOES ANYONE TRY TO KEEP YOU FROM HAVING/CONTACTING OTHER FRIENDS OR DOING THINGS OUTSIDE YOUR HOME?: NO

## 2024-02-26 SDOH — SOCIAL STABILITY: SOCIAL INSECURITY: ABUSE: ADULT

## 2024-02-26 ASSESSMENT — PAIN DESCRIPTION - ORIENTATION
ORIENTATION: RIGHT

## 2024-02-26 ASSESSMENT — COGNITIVE AND FUNCTIONAL STATUS - GENERAL
MOVING TO AND FROM BED TO CHAIR: A LOT
STANDING UP FROM CHAIR USING ARMS: A LOT
DAILY ACTIVITIY SCORE: 19
MOBILITY SCORE: 15
WALKING IN HOSPITAL ROOM: TOTAL
CLIMB 3 TO 5 STEPS WITH RAILING: TOTAL
TOILETING: A LITTLE
TOILETING: A LITTLE
MOVING FROM LYING ON BACK TO SITTING ON SIDE OF FLAT BED WITH BEDRAILS: A LITTLE
DRESSING REGULAR LOWER BODY CLOTHING: A LITTLE
HELP NEEDED FOR BATHING: A LITTLE
DRESSING REGULAR UPPER BODY CLOTHING: A LITTLE
WALKING IN HOSPITAL ROOM: A LOT
DRESSING REGULAR LOWER BODY CLOTHING: A LITTLE
DRESSING REGULAR UPPER BODY CLOTHING: A LITTLE
WALKING IN HOSPITAL ROOM: A LOT
PATIENT BASELINE BEDBOUND: NO
MOVING FROM LYING ON BACK TO SITTING ON SIDE OF FLAT BED WITH BEDRAILS: A LITTLE
HELP NEEDED FOR BATHING: A LITTLE
TOILETING: A LITTLE
STANDING UP FROM CHAIR USING ARMS: A LITTLE
DAILY ACTIVITIY SCORE: 19
MOBILITY SCORE: 10
MOBILITY SCORE: 14
DRESSING REGULAR LOWER BODY CLOTHING: A LITTLE
TURNING FROM BACK TO SIDE WHILE IN FLAT BAD: A LOT
CLIMB 3 TO 5 STEPS WITH RAILING: A LOT
PERSONAL GROOMING: A LITTLE
DAILY ACTIVITIY SCORE: 19
MOVING FROM LYING ON BACK TO SITTING ON SIDE OF FLAT BED WITH BEDRAILS: A LOT
PERSONAL GROOMING: A LITTLE
CLIMB 3 TO 5 STEPS WITH RAILING: TOTAL
DRESSING REGULAR UPPER BODY CLOTHING: A LITTLE
HELP NEEDED FOR BATHING: A LITTLE
TURNING FROM BACK TO SIDE WHILE IN FLAT BAD: A LITTLE
MOVING TO AND FROM BED TO CHAIR: A LITTLE
STANDING UP FROM CHAIR USING ARMS: A LOT
TURNING FROM BACK TO SIDE WHILE IN FLAT BAD: A LITTLE
MOVING TO AND FROM BED TO CHAIR: A LOT
PERSONAL GROOMING: A LITTLE

## 2024-02-26 ASSESSMENT — LIFESTYLE VARIABLES
AUDIT-C TOTAL SCORE: 1
HOW OFTEN DO YOU HAVE 6 OR MORE DRINKS ON ONE OCCASION: NEVER
AUDIT-C TOTAL SCORE: 1
HOW OFTEN DO YOU HAVE A DRINK CONTAINING ALCOHOL: MONTHLY OR LESS
SKIP TO QUESTIONS 9-10: 1
HOW MANY STANDARD DRINKS CONTAINING ALCOHOL DO YOU HAVE ON A TYPICAL DAY: 1 OR 2

## 2024-02-26 ASSESSMENT — PAIN SCALES - GENERAL
PAINLEVEL_OUTOF10: 2
PAIN_LEVEL: 7
PAINLEVEL_OUTOF10: 7
PAINLEVEL_OUTOF10: 6
PAINLEVEL_OUTOF10: 6
PAINLEVEL_OUTOF10: 7
PAINLEVEL_OUTOF10: 5 - MODERATE PAIN
PAINLEVEL_OUTOF10: 6
PAINLEVEL_OUTOF10: 7
PAINLEVEL_OUTOF10: 6
PAINLEVEL_OUTOF10: 9
PAINLEVEL_OUTOF10: 7
PAINLEVEL_OUTOF10: 8

## 2024-02-26 ASSESSMENT — COLUMBIA-SUICIDE SEVERITY RATING SCALE - C-SSRS
6. HAVE YOU EVER DONE ANYTHING, STARTED TO DO ANYTHING, OR PREPARED TO DO ANYTHING TO END YOUR LIFE?: NO
2. HAVE YOU ACTUALLY HAD ANY THOUGHTS OF KILLING YOURSELF?: NO
1. IN THE PAST MONTH, HAVE YOU WISHED YOU WERE DEAD OR WISHED YOU COULD GO TO SLEEP AND NOT WAKE UP?: NO

## 2024-02-26 ASSESSMENT — ACTIVITIES OF DAILY LIVING (ADL)
JUDGMENT_ADEQUATE_SAFELY_COMPLETE_DAILY_ACTIVITIES: YES
LACK_OF_TRANSPORTATION: NO
BATHING: INDEPENDENT
HEARING - RIGHT EAR: DIFFICULTY WITH NOISE
HEARING - LEFT EAR: DIFFICULTY WITH NOISE
PATIENT'S MEMORY ADEQUATE TO SAFELY COMPLETE DAILY ACTIVITIES?: YES
WALKS IN HOME: INDEPENDENT
ADEQUATE_TO_COMPLETE_ADL: YES
GROOMING: INDEPENDENT
DRESSING YOURSELF: INDEPENDENT
BATHING_ASSISTANCE: MODERATE
ADL_ASSISTANCE: INDEPENDENT
FEEDING YOURSELF: INDEPENDENT
TOILETING: INDEPENDENT
ADL_ASSISTANCE: INDEPENDENT

## 2024-02-26 ASSESSMENT — PAIN DESCRIPTION - LOCATION
LOCATION: KNEE
LOCATION: KNEE

## 2024-02-26 ASSESSMENT — PATIENT HEALTH QUESTIONNAIRE - PHQ9
SUM OF ALL RESPONSES TO PHQ9 QUESTIONS 1 & 2: 0
2. FEELING DOWN, DEPRESSED OR HOPELESS: NOT AT ALL
1. LITTLE INTEREST OR PLEASURE IN DOING THINGS: NOT AT ALL

## 2024-02-26 ASSESSMENT — PAIN DESCRIPTION - DESCRIPTORS
DESCRIPTORS: ACHING
DESCRIPTORS: ACHING
DESCRIPTORS: THROBBING;ACHING

## 2024-02-26 NOTE — OP NOTE
Arthroplasty Total Knee (R) Operative Note     Date: 2024  OR Location: [facility]    Name: Johanna Fair, : 1945, Age: 78 y.o., MRN: [id number], Sex: female    Diagnosis  Pre-op Diagnosis     * Primary osteoarthritis of right knee [M17.11] Post-op Diagnosis     * Primary osteoarthritis of right knee [M17.11]     Procedures  Arthroplasty Total Knee  41883 - AR ARTHRP KNE CONDYLE&PLATU MEDIAL&LAT COMPARTMENTS      Surgeons      * Jeyson Alvarez - Primary    Resident/Fellow/Other Assistant:  Surgeon(s) and Role:    Procedure Summary  Anesthesia: General  ASA: II  Anesthesia Staff: Anesthesiologist: Kylie Mcmillan MD MPH  Estimated Blood Loss: 50 mL  Intra-op Medications:   Administrations occurring from 1030 to 1330 on 24:   Medication Name Total Dose   vancomycin (Vancocin) vial for injection 2 g   lactated Ringer's infusion 1,068.33 mL   ceFAZolin in dextrose (iso-os) (Ancef) IVPB 2 g 2 g              Anesthesia Record               Intraprocedure I/O Totals          Intake    Tranexamic Acid 0.00 mL    The total shown is the total volume documented since Anesthesia Start was filed.    Total Intake 0 mL          Specimen: No specimens collected     Staff:   Circulator: Jc Flanagan RN  Scrub Person: Jenny Gonzalez         Drains and/or Catheters: * None in log *    Tourniquet Times:     Total Tourniquet Time Documented:  Thigh (Right) - 76 minutes  Total: Thigh (Right) - 76 minutes      Implants:  Implants       Type Name Action Serial No.      Implant CEMENT, BONE, SIMPLEX HV FULL DOSE  40 GM - [id number] Implanted      Implant CEMENT, BONE, SIMPLEX HV FULL DOSE  40 GM - [id number] Implanted      Joint BASEPLATE, TIBIA 4 TS TRIATH - [id number] Implanted      Joint FEM COMP, TRIATH GARY PS P 4 RIGHT - [id number] Implanted      Joint PATELLA, TRIATHLON, ASYMMETRIC X3, SZ-A32 10MM - [id number] Implanted      Joint PEG, FEMORAL DISTAL FIXATION - [id number] Implanted      Joint INSERT, TIBIAL, X3  ONOFRE CHOWDHURY, SZ-4 11MM - [id number] Implanted               Findings: See op note    Indications: Johanna Fair is an 78 y.o. female who is having surgery for right knee osteoarthritis.  This is a patient who has severe arthritis in the above knee. The patient had difficulty with daily activities and had failed all conservative management. Options were discussed.  The patient desired total knee arthroplasty understanding the risks to include, but are not be limited to: loss of life, loss of limb, need for further surgery, nonunion, malunion, infection, nerve and vessel injury, leg length discrepancy, stiffness, and thromboembolic complication. Informed consent was obtained.    The patient was seen in the preoperative area. The risks, benefits, complications, treatment options, non-operative alternatives, expected recovery and outcomes were discussed with the patient. The possibilities of reaction to medication, pulmonary aspiration, injury to surrounding structures, bleeding, recurrent infection, the need for additional procedures, failure to diagnose a condition, and creating a complication requiring transfusion or operation were discussed with the patient. The patient concurred with the proposed plan, giving informed consent.  The site of surgery was properly noted/marked if necessary per policy. The patient has been actively warmed in preoperative area. Preoperative antibiotics have been ordered and given within 1 hours of incision. Venous thrombosis prophylaxis have been ordered including unilateral sequential compression device    Procedure Details: DESCRIPTION OF PROCEDURE: The patient was transferred to the operative suite after appropriate preoperative preparation and site marking. Preoperative intravenous antibiotics and tranexamic acid were administered as indicated. Anesthesia was induced.  The knee was prepared in the usual sterile fashion, draped in the usual sterile fashion and incision marked. The  tourniquet was inflated to appropriate pressure. Incision was made in the midline. Medial parapatellar approach was utilized. The fat pad was removed, patella was everted, and gentle medial dissection was performed along the proximal tibia. Eburnated bone was noted in more than one compartment. The knee was flexed and the femoral starting point was identified medial to Erica´s line, anterior to the PCL insertion, and the intramedullary guide was utilized to cut the femur in 5 degrees valgus. Next, the knee was maximally flexed and the posterior cruciate retractor was utilized to retract the tibia forward and the remaining menisci were removed. The intra medullary tibial guide was placed in line with the tibia and was used to cut the tibia at the appropriate depth and slope based on preoperative planning. Soft tissues were protected throughout the cuts.  The bone fragments were removed and tibial osteophytes were removed.  We then placed our size 11 millimeter spacer block in extension which had good varus and valgus stability.  Neck the femoral sizing guide was utilized and showed the listed size above was appropriate size. The finishing cutting guide was then utilized with the axis set at 3 degrees external based on the epicondylar axis. The finishing cuts were made with the soft tissue protected, bone fragments were removed, and remaining femoral osteophytes were removed.  The baseplate tibial trial was then placed in appropriate rotation with the guide just at the medial one third of the tibial tubercle and the punch and peg were used. posterior osteophytes were removed in their entirety along with any other posterior debris.  The trial was placed and femur prepared for the component. Trialing was performed, and after appropriate soft tissue balancing showed there was excellent equality of flexion/extension gaps and excellent varus/valgus stability throughout a range of motion with the above size spacer. The  patella was then treated as indicated above and instrumented based on the amount of patellar wear. Next, the patellar tracking was seen to be excellent with no thumbs.  A lateral release was not necessary.  Next, thorough irrigation was performed and the final implants were cemented into place. The cement was allowed to cure and excess cement was removed. The final spacer was placed. This snapped in quite nicely and, again, motions were excellent and stability was excellent throughout a range of motion from 0 to 125 degrees. Thorough irrigation was performed and the wound was closed using #1 Vicryl sutures in the parapatellar approach along with #1 Stratafix, 2-0 monocryl sutures in the subcutaneous skin, and 3-0 V-Loc used in the cutaneous skin. Prineo dressing with Derma perea was used for wound sealant and sterile dressings were applied when the Dermabond was dry. The patient was awakened and transferred to recovery room in stable condition.     PLAN: The patient will be weightbearing as tolerated on the operative lower extremity.  Aspirin 81 milligrams twice a day for deep vein thrombosis prophylaxis.  Standard postoperative knee replacement protocols will be followed.  Complications:  None; patient tolerated the procedure well.    Disposition: PACU - hemodynamically stable.  Condition: stable         Additional Details: None    Attending Attestation: I performed the procedure.    Jeyson Alvarez  Phone Number: [phone]

## 2024-02-26 NOTE — ANESTHESIA PROCEDURE NOTES
Peripheral Block    Patient location during procedure: pre-op  Start time: 2/26/2024 10:19 AM  End time: 2/26/2024 10:22 AM  Reason for block: at surgeon's request  Staffing  Performed: attending   Authorized by: Kylie Mcmillan MD MPH    Performed by: Magdiel Simpson MD  Preanesthetic Checklist  Completed: patient identified, IV checked, site marked, risks and benefits discussed, surgical consent, monitors and equipment checked, pre-op evaluation and timeout performed   Timeout performed at: 2/26/2024 10:17 AM  Peripheral Block  Patient position: laying flat  Prep: ChloraPrep  Patient monitoring: heart rate  Block type: adductor canal  Laterality: right  Injection technique: single-shot  Guidance: ultrasound guided  Local infiltration: bupivicaine  Infiltration strength: 0.4 %  Dose: 35 mL  Needle  Needle type: pencil-tip   Needle gauge: 20 G  Needle length: 10 cm  Needle localization: ultrasound guidance  Needle insertion depth: 5 cm  Test dose: negative  Assessment  Injection assessment: negative aspiration for heme, no paresthesia on injection, incremental injection and local visualized surrounding nerve on ultrasound  Paresthesia pain: none  Heart rate change: no  Slow fractionated injection: yes

## 2024-02-26 NOTE — PROGRESS NOTES
Physical Therapy    Physical Therapy Evaluation & Treatment    Patient Name: Johanna Fair  MRN: [id number]  Today's Date: 2/26/2024   Time Calculation  Start Time: 1540  Stop Time: 1603  Time Calculation (min): 23 min    Owns a FWW already. No further device needs identified for DC planning    Assessment/Plan   PT Assessment  PT Assessment Results: Decreased strength, Decreased endurance, Decreased range of motion, Impaired balance, Decreased mobility, Decreased coordination, Decreased cognition, Impaired judgement, Decreased safety awareness, Decreased skin integrity, Orthopedic restrictions, Pain  Rehab Prognosis: Good  Evaluation/Treatment Tolerance: Patient tolerated treatment well  Medical Staff Made Aware: Yes  End of Session Communication: Bedside nurse  End of Session Patient Position: Bed, 3 rail up, Alarm on   IP OR SWING BED PT PLAN  Inpatient or Swing Bed: Inpatient  PT Plan  Treatment/Interventions: Bed mobility, Gait training, Transfer training, Stair training, Balance training, Strengthening, Endurance training, Range of motion, Therapeutic exercise, Therapeutic activity, Home exercise program  PT Plan: Skilled PT  PT Frequency: BID  PT Discharge Recommendations: Low intensity level of continued care  Equipment Recommended upon Discharge: Wheeled walker  PT Recommended Transfer Status: Assist x2, Assistive device  PT - OK to Discharge: Yes      Subjective     General Visit Information:  General  Reason for Referral: Recent Surg  Referred By: Dr. Alvarez  Past Medical History Relevant to Rehab: NA  Family/Caregiver Present: Yes  Caregiver Feedback: Spouse+DTR  Co-Treatment: OT  Co-Treatment Reason: Safety with Mobility  Prior to Session Communication: Bedside nurse  Patient Position Received: Bed, 3 rail up, Alarm on  Preferred Learning Style: verbal  General Comment: 78 year old female admits sp R TKA completed by Dr. Alvarez  Home Living:  Home Living  Type of Home: House  Lives With: Spouse (DTR will be  staying with her post op to assist)  Home Adaptive Equipment: Walker rolling or standard, Cane  Home Layout: One level  Home Access: Stairs to enter without rails  Entrance Stairs-Rails: None  Entrance Stairs-Number of Steps: 2  Bathroom Shower/Tub: Tub/shower unit  Bathroom Equipment: Grab bars in shower, Grab bars around toilet, Raised toilet seat with rails (family buying tub bench)  Prior Level of Function:  Prior Function Per Pt/Caregiver Report  Level of [place]: Independent with ADLs and functional transfers, Independent with homemaking with ambulation  Receives Help From: Family  ADL Assistance: Independent  Homemaking Assistance: Independent  Ambulatory Assistance: Independent  Prior Function Comments: denies falls  Precautions:  Precautions  LE Weight Bearing Status: Weight Bearing as Tolerated  Medical Precautions: Fall precautions  Post-Surgical Precautions: Right total knee precautions    Objective   Pain:  Pain Assessment  Pain Assessment: 0-10  Pain Score: 7  Pain Type: Surgical pain  Pain Location: Knee  Pain Orientation: Right  Pain Interventions: Ambulation/increased activity  Cognition:  Cognition  Overall Cognitive Status: Impaired (Post op confusion+lethargy)  Insight: Mild  Impulsive: Mildly  Processing Speed: Delayed    General Assessments:  Activity Tolerance  Endurance: Decreased tolerance for upright activites    Sensation  Light Touch: No apparent deficits  Functional Assessments:  Bed Mobility  Bed Mobility: Yes  Bed Mobility 1  Bed Mobility 1: Supine to sitting  Level of Assistance 1: Moderate assistance  Bed Mobility Comments 1: assist for BLEs and trunk  Bed Mobility 2  Bed Mobility  2: Sitting to supine  Level of Assistance 2: Moderate assistance  Bed Mobility Comments 2: assist for BLEs and trunk    Transfers  Transfer: Yes  Transfer 1  Transfer From 1: Bed to  Transfer to 1: Stand  Technique 1: Sit to stand  Transfer Device 1: Walker  Transfer Level of Assistance 1: Minimum  assistance, +2  Trials/Comments 1: cues on technique. Assist for uprighting    Ambulation/Gait Training  Ambulation/Gait Training Performed: Yes  Ambulation/Gait Training 1  Surface 1: Level tile  Device 1: Rolling walker  Assistance 1: Moderate assistance (x2)  Quality of Gait 1:  (slow, shuffled, antalgic, unable to elevate LLE off floor for swing phase)  Comments/Distance (ft) 1: 3' sideways along EOB  Ambulation/Gait Training 2  Surface 2: Level tile  Device 2: Rolling walker  Assistance 2: Moderate assistance (X2)  Quality of Gait 2:  (slow, shuffled, antalgic, cues for sequencing post op, soft R knee buckling)  Comments/Distance (ft) 2: 1' forward/backward away from EOB  Extremity/Trunk Assessments:  RLE   RLE : Exceptions to WFL  Strength RLE  RLE Overall Strength: Deficits  R Hip Flexion: 2/5  R Knee Flexion: 2/5  R Knee Extension: 2/5  LLE   LLE : Within Functional Limits  Treatments:  Ambulation/Gait Training  Ambulation/Gait Training Performed: Yes  Ambulation/Gait Training 1  Surface 1: Level tile  Device 1: Rolling walker  Assistance 1: Moderate assistance (x2)  Quality of Gait 1:  (slow, shuffled, antalgic, unable to elevate LLE off floor for swing phase)  Comments/Distance (ft) 1: 3' sideways along EOB  Ambulation/Gait Training 2  Surface 2: Level tile  Device 2: Rolling walker  Assistance 2: Moderate assistance (X2)  Quality of Gait 2:  (slow, shuffled, antalgic, cues for sequencing post op, soft R knee buckling)  Comments/Distance (ft) 2: 1' forward/backward away from EOB    Education as below  \  Outcome Measures:  [facility] Basic Mobility  Turning from your back to your side while in a flat bed without using bedrails: A lot  Moving from lying on your back to sitting on the side of a flat bed without using bedrails: A lot  Moving to and from bed to chair (including a wheelchair): A lot  Standing up from a chair using your arms (e.g. wheelchair or bedside chair): A lot  To walk in hospital room:  Total  Climbing 3-5 steps with railing: Total  Basic Mobility - Total Score: 10    Encounter Problems       Encounter Problems (Active)       Balance       Sitting Balance (Progressing)       Start:  02/26/24    Expected End:  03/11/24       Pt will demonstrate good sitting balance to promote safe engagement with out of bed activities           Standing Balance (Progressing)       Start:  02/26/24    Expected End:  03/11/24       Pt will demonstrate good static standing balance to promote safe participation with out of bed activity, transfers, and mobility              Mobility       Ambulation (Progressing)       Start:  02/26/24    Expected End:  03/11/24       Pt will ambulate 50' modified independent assist with walker to promote safe home mobility           Entry Stair Negotiation (Progressing)       Start:  02/26/24    Expected End:  03/11/24       Pt will ascend/descend 2 stairs, no rails, and walker with modified independent assist to safely enter/exit the home environment              Safety       Safe Mobility Techniques (Progressing)       Start:  02/26/24    Expected End:  03/11/24       Pt will correctly identify and demonstrate safe mobility techniques to reduce their risks for falls during their acute care stay            Joint Precaution Education (Progressing)       Start:  02/26/24    Expected End:  03/11/24       Pt will correctly verbalize 100% of their post op precautions and correctly demonstrate these during functional movement without cuing needs from therapist              Transfers       Supine to sit (Progressing)       Start:  02/26/24    Expected End:  03/11/24       Pt will transfer supine to sitting at edge of bed with modified independent assist to promote acute care out of bed activity           Sit to stand (Progressing)       Start:  02/26/24    Expected End:  03/11/24       Pt will transfer sit to standing position with modified independent assist and walker to promote safe out of  bed activity           Bed to chair (Progressing)       Start:  02/26/24    Expected End:  03/11/24       Pt will transfer from sitting edge of bed to the chair with modified independent assist and walker to promote out of bed activity and reduce the risks of prolonged acute care bedrest                  Education Documentation  Mobility Training, taught by Librado Loredo, PT at 2/26/2024  4:18 PM.  Learner: Significant Other, Family, Patient  Readiness: Acceptance  Method: Explanation, Demonstration  Response: Needs Reinforcement  Comment: safe mobility techniques, AD use, gait sequencing and deviation correction, WB, and joint precautions post op    Education Comments  No comments found.

## 2024-02-26 NOTE — CONSULTS
HospitalistInpatient consult to Medicine  Consult performed by: Van Vallejo, APRN-CNP  Consult ordered by: Jeyson Alvarez MD          Reason For Consult  GERD    History Of Present Illness  Johanna Fair is a 78 y.o. female presenting with right knee pain.  Patient is a 78-year-old female patient with past medical history of GERD; patient presented at [facility] for scheduled right total knee replacement due to osteoarthritis.  Hospitalist service was consulted for management of patient's chronic illnesses such as GERD.  Patient was seen and evaluated, denies any chest pain or shortness of breath, denies any fevers or chills, denies any palpitation, does report some nausea.  Past Medical History  She has a past medical history of Arthritis, Dry eye, GERD (gastroesophageal reflux disease), and PONV (postoperative nausea and vomiting).    Surgical History  She has a past surgical history that includes Hysterectomy; Colonoscopy; Upper gastrointestinal endoscopy; Cholecystectomy; Cataract extraction (Bilateral); Knee arthroscopy w/ meniscal repair (Right); Hiatal hernia repair; and Foot surgery (Left).     Social History  She reports that she has never smoked. She has never used smokeless tobacco. She reports current alcohol use. She reports that she does not use drugs.    Family History  No family history on file.     Allergies  Morphine, Tramadol, and Nubain [nalbuphine]    Review of Systems  Constitutional: Negative.    HENT: Negative.     Eyes: Negative.    Respiratory: denies  Cardiovascular: Negative.    Gastrointestinal: Negative.    Endocrine: Negative.    Genitourinary: Negative.    Musculoskeletal:  right knee pain  Neurological: Negative.      Physical Exam  Vitals reviewed.   Constitutional:       Appearance: Normal appearance.   HENT:      Head: Normocephalic and atraumatic.      Mouth/Throat:      Mouth: Mucous membranes are moist.      Pharynx: Oropharynx is clear.   Eyes:      Extraocular  Movements: Extraocular movements intact.      Pupils: Pupils are equal, round, and reactive to light.   Cardiovascular:      Rate and Rhythm: Normal rate and regular rhythm.      Heart sounds: Normal heart sounds.   Pulmonary:      Breath sounds: Normal breath sounds.   Abdominal:      General: Bowel sounds are normal.      Palpations: Abdomen is soft.   Musculoskeletal:      Comments: Status post right total knee replacement, dressing dry and intact, pulses are present and palpable.  Skin:     General: Skin is warm and dry.   Neurological:      General: No focal deficit present.      Mental Status: She is alert and oriented to person, place, and time.   Psychiatric:         Mood and Affect: Mood normal.         Behavior: Behavior normal.              Last Recorded Vitals  /74 (BP Location: Right arm, Patient Position: Lying)   Pulse 72   Temp 36.1 °C (97 °F) (Temporal)   Resp 18   Wt 90.7 kg (200 lb)   SpO2 95%     Relevant Results       Assessment/Plan   Right total knee replacement  -Managed by orthopedic surgery  -pre-op clearance  done by cardiology for EKG changes. ECHO completed, EF 57%, impaired relaxation pattern, recent MR  -Supportive care  -PT, OT, fall precaution  -Repeated discharge home tomorrow with home care    GERD  -PPI    Nausea  -As needed Zofran    DVT risk  Per orthopedic surgery    Discharge plan  Anticipated discharge home tomorrow with home care  Family at bedside, discussed plan of care  Daughter will stay with patient for a while while patient is recovering.    Thank you for allowing us be part of this patient's care.  Will sign off.  Please call as needed.  Van Vallejo, APRN-CNP

## 2024-02-26 NOTE — ANESTHESIA POSTPROCEDURE EVALUATION
Patient: Johanna Fair    Procedure Summary       Date: 02/26/24 Room / Location: [facility] OR 05 / [facility] OR    Anesthesia Start: 1043 Anesthesia Stop: 1318    Procedure: Arthroplasty Total Knee (Right: Knee) Diagnosis:       Primary osteoarthritis of right knee      (right knee osteoarthritis)    Surgeons: Jeyson Alvarez MD Responsible Provider: Kylie Mcmillan MD MPH    Anesthesia Type: general ASA Status: 2            Anesthesia Type: general    Vitals Value Taken Time   /82 02/26/24 1331   Temp 36.3 °C (97.3 °F) 02/26/24 1313   Pulse 69 02/26/24 1332   Resp 15 02/26/24 1332   SpO2 98 % 02/26/24 1332   Vitals shown include unvalidated device data.    Anesthesia Post Evaluation    Patient location during evaluation: PACU  Patient participation: complete - patient participated  Level of consciousness: awake and alert  Pain score: 7  Pain management: adequate  Multimodal analgesia pain management approach  Airway patency: patent  Two or more strategies used to mitigate risk of obstructive sleep apnea  Cardiovascular status: acceptable  Respiratory status: acceptable  Hydration status: acceptable  Postoperative Nausea and Vomiting: none    No notable events documented.

## 2024-02-26 NOTE — NURSING NOTE
Patient was admitted to the floor from PACU. Patient is alert and oriented to room. Bed alarm is engaged. Family is at bedside.

## 2024-02-26 NOTE — ANESTHESIA PROCEDURE NOTES
Airway  Date/Time: 2/26/2024 10:54 AM  Urgency: elective    Airway not difficult    Staffing  Performed: ERIS   Authorized by: Kylie Mcmillan MD MPH    Performed by: Kylie Mcmillan MD MPH  Patient location during procedure: OR    Indications and Patient Condition  Indications for airway management: anesthesia  Spontaneous Ventilation: absent  Sedation level: deep  Preoxygenated: yes  Patient position: sniffing  Mask difficulty assessment: 0 - not attempted  Planned trial extubation    Final Airway Details  Final airway type: supraglottic airway      Successful airway: classic  Size 4     Number of attempts at approach: 1

## 2024-02-26 NOTE — PROGRESS NOTES
Occupational Therapy    Evaluation    Patient Name: Johanna Fair  MRN: [id number]  Today's Date: 2/26/2024  Time Calculation  Start Time: 1542  Stop Time: 1602  Time Calculation (min): 20 min    Assessment  IP OT Assessment  OT Assessment: Pt is 79 y/o female admitted for elective Rt TKA. Pt presents w/ decreased ADL skills/ functional mobility, decreasedactivity tolerance and surgical limitations. Recommend OT services to address above deficits.  Prognosis: Good  Barriers to Discharge: None  End of Session Communication: Bedside nurse  End of Session Patient Position: Bed, 3 rail up, Alarm on  Plan:  Treatment Interventions: ADL retraining, Functional transfer training, Endurance training, Patient/family training, Equipment evaluation/education  OT Frequency: 4 times per week  OT Discharge Recommendations: Low intensity level of continued care  Equipment Recommended upon Discharge: Wheeled walker  OT - OK to Discharge: Yes    Subjective   Current Problem:  1. S/P total knee arthroplasty, right  Referral to Home Health        General:  General  Reason for Referral: recent sx  Referred By: Dr. Alvarez  Past Medical History Relevant to Rehab: arthritis, GERD, PONV  Family/Caregiver Present: Yes  Caregiver Feedback: Spouse, dtr  Co-Treatment: PT  Co-Treatment Reason: SAfety in mobility post op, pt tolerance  Prior to Session Communication: Bedside nurse  Patient Position Received: Alarm on, Bed, 3 rail up  Preferred Learning Style: verbal  General Comment: Cleared to see for eval, pt agreeable to therapy  Precautions:  LE Weight Bearing Status: Weight Bearing as Tolerated  Medical Precautions: Fall precautions  Post-Surgical Precautions: Right total knee precautions  Vital Signs:     Pain:  Pain Assessment  Pain Assessment: 0-10  Pain Score: 6  Pain Type: Surgical pain  Pain Location: Knee  Pain Orientation: Right  Pain Interventions: Cold pack    Objective   Cognition:  Overall Cognitive Status: Impaired (post op lethargy,  slow to process)  Insight: Mild  Impulsive: Mildly  Processing Speed: Delayed           Home Living:  Type of Home: House  Lives With: Spouse (Dtr will be staying to assist)  Home Adaptive Equipment: Walker rolling or standard, Cane  Home Layout: One level  Home Access: Stairs to enter without rails  Entrance Stairs-Rails: None  Entrance Stairs-Number of Steps: 2  Bathroom Shower/Tub: Tub/shower unit  Bathroom Equipment: Grab bars in shower, Grab bars around toilet, Raised toilet seat with rails (Family is purchasing transfer tub bench)   Prior Function:  Level of [place]: Independent with ADLs and functional transfers, Independent with homemaking with ambulation  Receives Help From: Family  ADL Assistance: Independent  Homemaking Assistance: Independent  Ambulatory Assistance: Independent  Hand Dominance: Right  IADL History:     ADL:  Eating Assistance: Stand by  Eating Deficit: Setup (to drink beverage)  Grooming Assistance: Stand by  Grooming Deficit: Setup (per clinical judgement)  Bathing Assistance: Moderate  Bathing Deficit:  (per clinical judgement)  UE Dressing Assistance: Stand by  UE Dressing Deficit: Setup (per clinical judgement)  LE Dressing Assistance: Moderate  LE Dressing Deficit:  (perclinical judgement)  Toileting Assistance with Device: Not performed  Functional Assistance: Not performed  Activity Tolerance:  Endurance: Decreased tolerance for upright activites  Bed Mobility/Transfers: Bed Mobility 1  Bed Mobility 1: Supine to sitting  Level of Assistance 1: Moderate assistance  Bed Mobility Comments 1: for legs out, trunk up  Bed Mobility 2  Bed Mobility  2: Sitting to supine  Level of Assistance 2: Moderate assistance  Bed Mobility Comments 2: for legs in, trunk down    Transfers  Transfer: Yes  Transfer 1  Transfer From 1: Bed to  Transfer to 1: Stand  Technique 1: Sit to stand  Transfer Device 1: Walker  Transfer Level of Assistance 1: Minimum assistance, +2  Trials/Comments 1: Pt  sidestepped to HOB w/ mod, assist x2 , then took 1 step froward/ back w/ mod. asist x2 and FWW. Verbal cues for hand placement  Transfers 2  Transfer From 2: Stand to  Transfer to 2: Sit, Bed  Technique 2: Stand to sit  Transfer Device 2: Walker  Transfer Level of Assistance 2: Minimum assistance, +2  Trials/Comments 2: Verbal cues for safe hand placement  Modalities:     IADL's:      Vision: Vision - Basic Assessment  Current Vision: Wears glasses all the time  Sensation:  Light Touch: No apparent deficits  Strength:  Strength Comments: =/> 3/5 per functional observ. (BUE)  Perception:     Coordination:  Movements are Fluid and Coordinated: Yes   Hand Function:  Hand Function  Gross Grasp: Functional  Coordination: Functional  Extremities: RUE   RUE : Within Functional Limits and LUE   LUE: Within Functional Limits      Outcome Measures: [facility] Daily Activity  Putting on and taking off regular lower body clothing: A little  Bathing (including washing, rinsing, drying): A little  Putting on and taking off regular upper body clothing: A little  Toileting, which includes using toilet, bedpan or urinal: A little  Taking care of personal grooming such as brushing teeth: A little  Eating Meals: None  Daily Activity - Total Score: 19      Education Documentation  ADL Training, taught by Smita Curtis OT at 2/26/2024  4:31 PM.  Learner: Patient  Readiness: Acceptance  Method: Explanation  Response: Needs Reinforcement    Education Comments  No comments found.      Goals:   Encounter Problems       Encounter Problems (Active)       Balance       Sitting Balance (Progressing)       Start:  02/26/24    Expected End:  03/11/24       Pt will demonstrate good sitting balance to promote safe engagement with out of bed activities           Standing Balance (Progressing)       Start:  02/26/24    Expected End:  03/11/24       Pt will demonstrate good static standing balance to promote safe participation with out of bed activity,  transfers, and mobility              Mobility       Ambulation (Progressing)       Start:  02/26/24    Expected End:  03/11/24       Pt will ambulate 50' modified independent assist with walker to promote safe home mobility           Entry Stair Negotiation (Progressing)       Start:  02/26/24    Expected End:  03/11/24       Pt will ascend/descend 2 stairs, no rails, and walker with modified independent assist to safely enter/exit the home environment              OT Goals       ADL's (Progressing)       Start:  02/26/24    Expected End:  03/11/24       Pt will complete bathing, dressing and grooming tasks w/ mod I w/ AE/ compensatory tech as needed for ease, safety and increased independence in self care tasks.         Functional transfers (Progressing)       Start:  02/26/24    Expected End:  03/11/24       Pt will demon. Bed, chair and toilet transfers w/ mod I w/ LRD for safety and increased independence in functional transfers.         Precautions (Progressing)       Start:  02/26/24    Expected End:  03/11/24       Pt will verbalize/ demon. Surgical precautions for safety and increased independence in daily activities and functional mobility            Safety       Safe Mobility Techniques (Progressing)       Start:  02/26/24    Expected End:  03/11/24       Pt will correctly identify and demonstrate safe mobility techniques to reduce their risks for falls during their acute care stay            Joint Precaution Education (Progressing)       Start:  02/26/24    Expected End:  03/11/24       Pt will correctly verbalize 100% of their post op precautions and correctly demonstrate these during functional movement without cuing needs from therapist              Transfers       Supine to sit (Progressing)       Start:  02/26/24    Expected End:  03/11/24       Pt will transfer supine to sitting at edge of bed with modified independent assist to promote acute care out of bed activity           Sit to stand  (Progressing)       Start:  02/26/24    Expected End:  03/11/24       Pt will transfer sit to standing position with modified independent assist and walker to promote safe out of bed activity           Bed to chair (Progressing)       Start:  02/26/24    Expected End:  03/11/24       Pt will transfer from sitting edge of bed to the chair with modified independent assist and walker to promote out of bed activity and reduce the risks of prolonged acute care bedrest

## 2024-02-26 NOTE — ADDENDUM NOTE
Addendum  created 02/26/24 4850 by Magdiel Simpson MD    Child order released for a procedure order, Clinical Note Signed, Intraprocedure Blocks edited, SmartForm saved

## 2024-02-26 NOTE — CARE PLAN
The patient's goals for the shift include control pain, and be nausea free for the shift.     The clinical goals for the shift include Early ambulation, maintain safety, and manage pain.      Problem: Pain  Goal: Takes deep breaths with improved pain control throughout the shift  Outcome: Progressing  Goal: Turns in bed with improved pain control throughout the shift  Outcome: Progressing  Goal: Walks with improved pain control throughout the shift  Outcome: Progressing  Goal: Performs ADL's with improved pain control throughout shift  Outcome: Progressing  Goal: Participates in PT with improved pain control throughout the shift  Outcome: Progressing  Goal: Free from opioid side effects throughout the shift  Outcome: Progressing  Goal: Free from acute confusion related to pain meds throughout the shift  Outcome: Progressing     Problem: Pain  Goal: My pain/discomfort is manageable  Outcome: Progressing     Problem: Safety  Goal: Patient will be injury free during hospitalization  Outcome: Progressing  Goal: I will remain free of falls  Outcome: Progressing     Problem: Daily Care  Goal: Daily care needs are met  Outcome: Progressing     Problem: Psychosocial Needs  Goal: Demonstrates ability to cope with hospitalization/illness  Outcome: Progressing  Goal: Collaborate with me, my family, and caregiver to identify my specific goals  Outcome: Progressing  Flowsheets (Taken 2/26/2024 1502)  Cultural Requests During Hospitalization: NA  Spiritual Requests During Hospitalization: NA     Problem: Discharge Barriers  Goal: My discharge needs are met  Outcome: Progressing

## 2024-02-26 NOTE — ANESTHESIA PREPROCEDURE EVALUATION
Patient: Johanna Fair    Procedure Information       Date/Time: 02/26/24 1030    Procedure: Arthroplasty Total Knee (Right: Knee) - [employer]    [facility]: [facility]    Surgeons: Jeyson Alvarez MD          Past Medical History:   Diagnosis Date    Arthritis     Dry eye     GERD (gastroesophageal reflux disease)     PONV (postoperative nausea and vomiting)       Relevant Problems   GI   (+) Gastroesophageal reflux disease without esophagitis      Musculoskeletal   (+) Osteoarthritis of knee     Past Surgical History:   Procedure Laterality Date    CATARACT EXTRACTION Bilateral     CHOLECYSTECTOMY      COLONOSCOPY      FOOT SURGERY Left     HIATAL HERNIA REPAIR      HYSTERECTOMY      KNEE ARTHROSCOPY W/ MENISCAL REPAIR Right     1/2023    UPPER GASTROINTESTINAL ENDOSCOPY        Clinical information reviewed:   Tobacco  Allergies  Meds   Med Hx  Surg Hx   Fam Hx  Soc Hx        NPO Detail:  NPO/Void Status  Carbohydrate Drink Given Prior to Surgery? : N  Date of Last Liquid: 02/25/24  Time of Last Liquid: 2100  Date of Last Solid: 02/25/24  Time of Last Solid: 2100  Last Intake Type: Clear fluids  Time of Last Void: 0909         Physical Exam    Airway  Mallampati: III  TM distance: <3 FB  Neck ROM: limited     Cardiovascular    Dental    Pulmonary    Abdominal            Anesthesia Plan    History of general anesthesia?: yes  History of complications of general anesthesia?: yes    ASA 2     general   (Pre op adductor canal block)  The patient is not a current smoker.  Patient was not previously instructed to abstain from smoking on day of procedure.  Patient did not smoke on day of procedure.    intravenous induction   Postoperative administration of opioids is intended.  Anesthetic plan and risks discussed with patient.    Plan discussed with attending.

## 2024-02-27 ENCOUNTER — DOCUMENTATION (OUTPATIENT)
Dept: HOME HEALTH SERVICES | Facility: HOME HEALTH | Age: 79
End: 2024-02-27
Payer: MEDICARE

## 2024-02-27 ENCOUNTER — HOME HEALTH ADMISSION (OUTPATIENT)
Dept: HOME HEALTH SERVICES | Facility: HOME HEALTH | Age: 79
End: 2024-02-27
Payer: MEDICARE

## 2024-02-27 VITALS
OXYGEN SATURATION: 95 % | WEIGHT: 200 LBS | TEMPERATURE: 99.1 F | SYSTOLIC BLOOD PRESSURE: 118 MMHG | HEART RATE: 74 BPM | RESPIRATION RATE: 16 BRPM | DIASTOLIC BLOOD PRESSURE: 70 MMHG | BODY MASS INDEX: 31.39 KG/M2 | HEIGHT: 67 IN

## 2024-02-27 LAB
ERYTHROCYTE [DISTWIDTH] IN BLOOD BY AUTOMATED COUNT: 13.8 % (ref 11.5–14.5)
HCT VFR BLD AUTO: 37.2 % (ref 36–46)
HGB BLD-MCNC: 12.3 G/DL (ref 12–16)
MCH RBC QN AUTO: 28.9 PG (ref 26–34)
MCHC RBC AUTO-ENTMCNC: 33.1 G/DL (ref 32–36)
MCV RBC AUTO: 87 FL (ref 80–100)
NRBC BLD-RTO: 0 /100 WBCS (ref 0–0)
PLATELET # BLD AUTO: 191 X10*3/UL (ref 150–450)
RBC # BLD AUTO: 4.26 X10*6/UL (ref 4–5.2)
WBC # BLD AUTO: 9.3 X10*3/UL (ref 4.4–11.3)

## 2024-02-27 PROCEDURE — 36415 COLL VENOUS BLD VENIPUNCTURE: CPT | Performed by: STUDENT IN AN ORGANIZED HEALTH CARE EDUCATION/TRAINING PROGRAM

## 2024-02-27 PROCEDURE — 97110 THERAPEUTIC EXERCISES: CPT | Mod: GP,CQ

## 2024-02-27 PROCEDURE — C9113 INJ PANTOPRAZOLE SODIUM, VIA: HCPCS | Performed by: NURSE PRACTITIONER

## 2024-02-27 PROCEDURE — 97535 SELF CARE MNGMENT TRAINING: CPT | Mod: GO,CO

## 2024-02-27 PROCEDURE — 2500000004 HC RX 250 GENERAL PHARMACY W/ HCPCS (ALT 636 FOR OP/ED): Performed by: NURSE PRACTITIONER

## 2024-02-27 PROCEDURE — 97530 THERAPEUTIC ACTIVITIES: CPT | Mod: GO,CO

## 2024-02-27 PROCEDURE — 85027 COMPLETE CBC AUTOMATED: CPT | Performed by: STUDENT IN AN ORGANIZED HEALTH CARE EDUCATION/TRAINING PROGRAM

## 2024-02-27 PROCEDURE — 2500000001 HC RX 250 WO HCPCS SELF ADMINISTERED DRUGS (ALT 637 FOR MEDICARE OP): Performed by: STUDENT IN AN ORGANIZED HEALTH CARE EDUCATION/TRAINING PROGRAM

## 2024-02-27 PROCEDURE — 2500000004 HC RX 250 GENERAL PHARMACY W/ HCPCS (ALT 636 FOR OP/ED): Performed by: STUDENT IN AN ORGANIZED HEALTH CARE EDUCATION/TRAINING PROGRAM

## 2024-02-27 PROCEDURE — 7100000011 HC EXTENDED STAY RECOVERY HOURLY - NURSING UNIT

## 2024-02-27 PROCEDURE — 97116 GAIT TRAINING THERAPY: CPT | Mod: GP,CQ

## 2024-02-27 RX ORDER — ASPIRIN 81 MG/1
81 TABLET ORAL 2 TIMES DAILY
Qty: 60 TABLET | Refills: 0 | Status: SHIPPED | OUTPATIENT
Start: 2024-02-27 | End: 2024-03-28

## 2024-02-27 RX ORDER — ASPIRIN 81 MG/1
81 TABLET ORAL 2 TIMES DAILY
Qty: 60 TABLET | Refills: 0 | Status: SHIPPED | OUTPATIENT
Start: 2024-02-27 | End: 2024-02-27 | Stop reason: SDUPTHER

## 2024-02-27 RX ORDER — HYDROCODONE BITARTRATE AND IBUPROFEN 7.5; 2 MG/1; MG/1
1 TABLET, FILM COATED ORAL EVERY 6 HOURS PRN
Qty: 40 TABLET | Refills: 0 | Status: SHIPPED | OUTPATIENT
Start: 2024-02-27

## 2024-02-27 RX ORDER — HYDROCODONE BITARTRATE AND IBUPROFEN 7.5; 2 MG/1; MG/1
1 TABLET, FILM COATED ORAL EVERY 6 HOURS PRN
Qty: 40 TABLET | Refills: 0 | Status: SHIPPED | OUTPATIENT
Start: 2024-02-27 | End: 2024-02-27 | Stop reason: SDUPTHER

## 2024-02-27 RX ADMIN — CEFAZOLIN SODIUM 2 G: 2 INJECTION, SOLUTION INTRAVENOUS at 09:52

## 2024-02-27 RX ADMIN — POLYETHYLENE GLYCOL 3350 17 G: 17 POWDER, FOR SOLUTION ORAL at 08:45

## 2024-02-27 RX ADMIN — PANTOPRAZOLE SODIUM 40 MG: 40 INJECTION, POWDER, FOR SOLUTION INTRAVENOUS at 08:45

## 2024-02-27 RX ADMIN — HYDROCODONE BITARTRATE AND ACETAMINOPHEN 2 TABLET: 5; 325 TABLET ORAL at 11:56

## 2024-02-27 RX ADMIN — SODIUM CHLORIDE 100 ML/HR: 900 INJECTION, SOLUTION INTRAVENOUS at 02:40

## 2024-02-27 RX ADMIN — HYDROCODONE BITARTRATE AND ACETAMINOPHEN 2 TABLET: 5; 325 TABLET ORAL at 07:52

## 2024-02-27 RX ADMIN — ASPIRIN 81 MG: 81 TABLET, COATED ORAL at 08:45

## 2024-02-27 RX ADMIN — CEFAZOLIN SODIUM 2 G: 2 INJECTION, SOLUTION INTRAVENOUS at 02:40

## 2024-02-27 RX ADMIN — HYDROCODONE BITARTRATE AND ACETAMINOPHEN 2 TABLET: 5; 325 TABLET ORAL at 03:38

## 2024-02-27 ASSESSMENT — PAIN SCALES - GENERAL
PAINLEVEL_OUTOF10: 2
PAINLEVEL_OUTOF10: 7
PAINLEVEL_OUTOF10: 4
PAINLEVEL_OUTOF10: 7
PAINLEVEL_OUTOF10: 4
PAINLEVEL_OUTOF10: 9
PAINLEVEL_OUTOF10: 8
PAINLEVEL_OUTOF10: 8

## 2024-02-27 ASSESSMENT — COGNITIVE AND FUNCTIONAL STATUS - GENERAL
STANDING UP FROM CHAIR USING ARMS: A LITTLE
STANDING UP FROM CHAIR USING ARMS: A LITTLE
DRESSING REGULAR LOWER BODY CLOTHING: A LITTLE
HELP NEEDED FOR BATHING: A LITTLE
CLIMB 3 TO 5 STEPS WITH RAILING: A LITTLE
MOVING TO AND FROM BED TO CHAIR: A LITTLE
MOBILITY SCORE: 18
DAILY ACTIVITIY SCORE: 21
DAILY ACTIVITIY SCORE: 20
MOVING TO AND FROM BED TO CHAIR: A LITTLE
TURNING FROM BACK TO SIDE WHILE IN FLAT BAD: A LITTLE
MOBILITY SCORE: 18
DRESSING REGULAR LOWER BODY CLOTHING: A LITTLE
WALKING IN HOSPITAL ROOM: A LITTLE
MOVING FROM LYING ON BACK TO SITTING ON SIDE OF FLAT BED WITH BEDRAILS: A LITTLE
DRESSING REGULAR UPPER BODY CLOTHING: A LITTLE
TOILETING: A LITTLE
TURNING FROM BACK TO SIDE WHILE IN FLAT BAD: A LITTLE
HELP NEEDED FOR BATHING: A LITTLE
CLIMB 3 TO 5 STEPS WITH RAILING: A LITTLE
TOILETING: A LITTLE
MOVING FROM LYING ON BACK TO SITTING ON SIDE OF FLAT BED WITH BEDRAILS: A LITTLE
WALKING IN HOSPITAL ROOM: A LITTLE

## 2024-02-27 ASSESSMENT — PAIN - FUNCTIONAL ASSESSMENT
PAIN_FUNCTIONAL_ASSESSMENT: 0-10

## 2024-02-27 NOTE — CARE PLAN
Problem: Pain  Goal: Takes deep breaths with improved pain control throughout the shift  Outcome: Progressing  Goal: Turns in bed with improved pain control throughout the shift  Outcome: Progressing  Goal: Walks with improved pain control throughout the shift  Outcome: Progressing  Goal: Performs ADL's with improved pain control throughout shift  Outcome: Progressing  Goal: Participates in PT with improved pain control throughout the shift  Outcome: Progressing  Goal: Free from opioid side effects throughout the shift  Outcome: Progressing  Goal: Free from acute confusion related to pain meds throughout the shift  Outcome: Progressing     Problem: Safety  Goal: Patient will be injury free during hospitalization  Outcome: Progressing  Goal: I will remain free of falls  Outcome: Progressing   The patient's goals for the shift include      The clinical goals for the shift include Pain management, PT/OT, strengthening and ambulation

## 2024-02-27 NOTE — HH CARE COORDINATION
Home Care received a Referral for Physical Therapy. We have processed the referral for a Start of Care on 2/28/24.     If you have any questions or concerns, please feel free to contact us at [phone]. Follow the prompts, enter your five digit zip code, and you will be directed to your care team on [facility].

## 2024-02-27 NOTE — PROGRESS NOTES
Physical Therapy    Physical Therapy Treatment    Patient Name: Johanna Fair  MRN: [id number]  Today's Date: 2/27/2024  Time Calculation  Start Time: 1303  Stop Time: 1338  Time Calculation (min): 35 min       Assessment/Plan   PT Assessment  PT Assessment Results: Decreased strength, Decreased endurance, Decreased range of motion, Impaired balance, Decreased mobility, Decreased coordination, Decreased cognition, Impaired judgement, Decreased safety awareness, Decreased skin integrity, Orthopedic restrictions, Pain  Rehab Prognosis: Good  End of Session Communication: Bedside nurse  End of Session Patient Position: Up in chair, Alarm off, not on at start of session, Alarm off, caregiver present     PT Plan  Treatment/Interventions: Bed mobility, Transfer training, Gait training, Therapeutic exercise  PT Plan: Skilled PT  PT Frequency: BID  PT Discharge Recommendations: Low intensity level of continued care  Equipment Recommended upon Discharge: Wheeled walker  PT Recommended Transfer Status: Assist x1, Assistive device  PT - OK to Discharge: Yes      General Visit Information:   PT  Visit  PT Received On: 02/27/24  General  Family/Caregiver Present: Yes  Caregiver Feedback: Spouse, dtr  Prior to Session Communication: Bedside nurse  Patient Position Received: Bed, 3 rail up  Preferred Learning Style: verbal  General Comment: Pt agreeable to therapy.    Subjective   Precautions:  Precautions  LE Weight Bearing Status: Weight Bearing as Tolerated  Medical Precautions: Fall precautions  Post-Surgical Precautions: Right total knee precautions  Vital Signs:       Objective   Pain:  Pain Assessment  Pain Assessment: 0-10  Pain Score: 7  Pain Type: Surgical pain  Pain Location: Knee  Pain Orientation: Right  Pain Interventions: Cold pack  Cognition:     Postural Control:     Extremity/Trunk Assessments:    Activity Tolerance:     Treatments:  Therapeutic Exercise  Therapeutic Exercise Performed: Yes  Therapeutic Exercise  Activity 1: Pt performed supine bilat LE ankle pumps, quad sets, glute sets, heel slides, hip abduction slides, SAQ and SLR x15 reps each.    Bed Mobility 1  Bed Mobility 1: Supine to sitting  Level of Assistance 1: Minimum assistance  Bed Mobility Comments 1: Min assist with right LE supine to sitting.    Ambulation/Gait Training 1  Surface 1: Level tile  Device 1: Rolling walker  Assistance 1: Contact guard, Minimal verbal cues  Quality of Gait 1: Decreased step length  Comments/Distance (ft) 1: Pt ambulated with RW ~60' x2 CGA. Pt demonstrated slow step to gait, very short step length and height. Pt needed verbal cues to stay closer to walker and to improve posture as able.  Transfer 1  Transfer From 1: Bed to  Transfer to 1: Stand  Technique 1: Sit to stand  Transfer Device 1: Walker  Transfer Level of Assistance 1: Close supervision  Transfers 2  Transfer From 2: Stand to  Transfer to 2: Sit, Chair with arms  Technique 2: Stand to sit  Transfer Level of Assistance 2: Close supervision    Outcome Measures:  [facility] Basic Mobility  Turning from your back to your side while in a flat bed without using bedrails: A little  Moving from lying on your back to sitting on the side of a flat bed without using bedrails: A little  Moving to and from bed to chair (including a wheelchair): A little  Standing up from a chair using your arms (e.g. wheelchair or bedside chair): A little  To walk in hospital room: A little  Climbing 3-5 steps with railing: A little  Basic Mobility - Total Score: 18    Education Documentation  Handouts, taught by Shayy Avery PTA at 2/27/2024  1:50 PM.  Learner: Patient  Readiness: Acceptance  Method: Explanation  Response: Verbalizes Understanding    Precautions, taught by Shayy Avery PTA at 2/27/2024  1:50 PM.  Learner: Patient  Readiness: Acceptance  Method: Explanation  Response: Verbalizes Understanding    Body Mechanics, taught by Shayy Avery PTA at 2/27/2024  1:50 PM.  Learner:  Patient  Readiness: Acceptance  Method: Explanation  Response: Verbalizes Understanding    Home Exercise Program, taught by Shayy Avery PTA at 2/27/2024  1:50 PM.  Learner: Patient  Readiness: Acceptance  Method: Explanation  Response: Verbalizes Understanding    Mobility Training, taught by Shayy Avery PTA at 2/27/2024  1:50 PM.  Learner: Patient  Readiness: Acceptance  Method: Explanation  Response: Verbalizes Understanding    Handouts, taught by Shayy Avery PTA at 2/27/2024  9:59 AM.  Learner: Patient  Readiness: Acceptance  Method: Explanation  Response: Verbalizes Understanding    Precautions, taught by Shayy Avery PTA at 2/27/2024  9:59 AM.  Learner: Patient  Readiness: Acceptance  Method: Explanation  Response: Verbalizes Understanding    Body Mechanics, taught by Shayy Avery PTA at 2/27/2024  9:59 AM.  Learner: Patient  Readiness: Acceptance  Method: Explanation  Response: Verbalizes Understanding    Home Exercise Program, taught by Shayy Avery PTA at 2/27/2024  9:59 AM.  Learner: Patient  Readiness: Acceptance  Method: Explanation  Response: Verbalizes Understanding    Mobility Training, taught by Shayy Avery PTA at 2/27/2024  9:59 AM.  Learner: Patient  Readiness: Acceptance  Method: Explanation  Response: Verbalizes Understanding    Education Comments  No comments found.        OP EDUCATION:  Outpatient Education  Individual(s) Educated: Patient  Education Provided: Home Exercise Program, Post-Op Precautions  Equipment: Thera-Band    Encounter Problems       Encounter Problems (Active)       Balance       Sitting Balance (Progressing)       Start:  02/26/24    Expected End:  03/11/24       Pt will demonstrate good sitting balance to promote safe engagement with out of bed activities           Standing Balance (Progressing)       Start:  02/26/24    Expected End:  03/11/24       Pt will demonstrate good static standing balance to promote safe participation with out of bed activity, transfers,  and mobility              Mobility       Ambulation (Progressing)       Start:  02/26/24    Expected End:  03/11/24       Pt will ambulate 50' modified independent assist with walker to promote safe home mobility           Entry Stair Negotiation (Progressing)       Start:  02/26/24    Expected End:  03/11/24       Pt will ascend/descend 2 stairs, no rails, and walker with modified independent assist to safely enter/exit the home environment              Safety       Safe Mobility Techniques (Progressing)       Start:  02/26/24    Expected End:  03/11/24       Pt will correctly identify and demonstrate safe mobility techniques to reduce their risks for falls during their acute care stay            Joint Precaution Education (Progressing)       Start:  02/26/24    Expected End:  03/11/24       Pt will correctly verbalize 100% of their post op precautions and correctly demonstrate these during functional movement without cuing needs from therapist              Transfers       Supine to sit (Progressing)       Start:  02/26/24    Expected End:  03/11/24       Pt will transfer supine to sitting at edge of bed with modified independent assist to promote acute care out of bed activity           Sit to stand (Progressing)       Start:  02/26/24    Expected End:  03/11/24       Pt will transfer sit to standing position with modified independent assist and walker to promote safe out of bed activity           Bed to chair (Progressing)       Start:  02/26/24    Expected End:  03/11/24       Pt will transfer from sitting edge of bed to the chair with modified independent assist and walker to promote out of bed activity and reduce the risks of prolonged acute care bedrest

## 2024-02-27 NOTE — PROGRESS NOTES
02/27/24 1142   Discharge Planning   Living Arrangements Spouse/significant other   Support Systems Spouse/significant other;Children   Assistance Needed home care upon discharge   Type of Residence Private residence   Number of Stairs to Enter Residence 2   Number of Stairs Within Residence 0   Do you have animals or pets at home? No   Who is requesting discharge planning? Patient   Home or Post Acute Services In home services   Type of Home Care Services Home OT;Home PT   Patient expects to be discharged to: home with [facility]       Home with [facility] after 2nd therapy today.  SOC 2/29

## 2024-02-27 NOTE — PROGRESS NOTES
"Johanna Fair is a 78 y.o. female on day 1 of admission presenting with S/P total knee arthroplasty, right.    Subjective   Patient seen today.  Worked with physical therapy.  Pleased with the results.  Plan for discharge home today with home health physical therapy.       Objective     Physical Exam  Right lower extremity: Postop dressing clean dry and intact.  No calf tenderness.  Neurovascular intact distally.  Last Recorded Vitals  Blood pressure 118/70, pulse 74, temperature 37.3 °C (99.1 °F), temperature source Temporal, resp. rate 16, height 1.702 m (5' 7.01\"), weight 90.7 kg (200 lb), SpO2 95 %.  Intake/Output last 3 Shifts:  I/O last 3 completed shifts:  In: 2743.3 (30.2 mL/kg) [I.V.:2643.3 (29.1 mL/kg); IV Piggyback:100]  Out: 600 (6.6 mL/kg) [Urine:600 (0.2 mL/kg/hr)]  Weight: 90.7 kg     Relevant Results      Scheduled medications  aspirin, 81 mg, oral, BID  pantoprazole, 40 mg, intravenous, Daily  polyethylene glycol, 17 g, oral, Daily  scopolamine, 1 patch, transdermal, Once      Continuous medications  lactated Ringer's, 100 mL/hr, Last Rate: Stopped (02/26/24 1600)  oxygen, 2 L/min  sodium chloride 0.9%, 100 mL/hr, Last Rate: 100 mL/hr (02/27/24 0240)      PRN medications  PRN medications: acetaminophen, HYDROcodone-acetaminophen, HYDROcodone-acetaminophen, naloxone, naloxone, naloxone, ondansetron ODT **OR** ondansetron  Results for orders placed or performed during the hospital encounter of 02/26/24 (from the past 24 hour(s))   CBC   Result Value Ref Range    WBC 9.3 4.4 - 11.3 x10*3/uL    nRBC 0.0 0.0 - 0.0 /100 WBCs    RBC 4.26 4.00 - 5.20 x10*6/uL    Hemoglobin 12.3 12.0 - 16.0 g/dL    Hematocrit 37.2 36.0 - 46.0 %    MCV 87 80 - 100 fL    MCH 28.9 26.0 - 34.0 pg    MCHC 33.1 32.0 - 36.0 g/dL    RDW 13.8 11.5 - 14.5 %    Platelets 191 150 - 450 x10*3/uL                            Assessment/Plan   Principal Problem:    S/P total knee arthroplasty, right  Active Problems:    Gastroesophageal reflux " disease without esophagitis    Osteoarthritis of knee    Postop day 1 from right total knee arthroplasty.  Plan for physical therapy for mobilization and strengthening.  DVT prophylaxis for 30 days.  Plan for discharge home today with home health physical therapy.             Jeyson Alvarez MD

## 2024-02-27 NOTE — DISCHARGE SUMMARY
Discharge Diagnosis  S/P total knee arthroplasty, right    Issues Requiring Follow-Up  Post right total knee arthroplasty    Test Results Pending At Discharge  Pending Labs       No current pending labs.            Hospital Course   Status post right total knee arthroplasty.  Worked with physical therapy.  Pain controlled with oral pain meds.  Plan for discharge home with home health physical therapy    Pertinent Physical Exam At Time of Discharge  Physical Exam  Right lower extremity: Postop dressing clean dry and intact.  No calf tenderness.  Neurovascular intact distally.  Home Medications     Medication List      ASK your doctor about these medications     chlorhexidine 0.12 % solution; Commonly known as: Peridex; Use as   directed - patient may  prescription prior to 2/25/2024 Do not   start before February 25, 2024.; Ask about: Should I take this medication?       Outpatient Follow-Up  Future Appointments   Date Time Provider Department Center   3/18/2024 10:00 AM CHULA Donaldson MD

## 2024-02-27 NOTE — PROGRESS NOTES
Occupational Therapy    OT Treatment    Patient Name: Johanna Fair  MRN: [id number]  Today's Date: 2/27/2024  Time Calculation  Start Time: 0720  Stop Time: 0825  Time Calculation (min): 65 min         Assessment:  End of Session Communication: Bedside nurse  End of Session Patient Position: Up in chair (all needs in reach, daughter in room.)  OT Assessment Results: Decreased ADL status, Decreased endurance, Decreased functional mobility  Plan:  Treatment Interventions: ADL retraining, Functional transfer training, Endurance training, Patient/family training, Equipment evaluation/education  OT Frequency: 4 times per week  OT Discharge Recommendations: Low intensity level of continued care  Equipment Recommended upon Discharge: Wheeled walker  OT - OK to Discharge: Yes  Treatment Interventions: ADL retraining, Functional transfer training, Endurance training, Patient/family training, Equipment evaluation/education    Subjective   Previous Visit Info:  OT Last Visit  OT Received On: 02/27/24  General:  General  Reason for Referral: recent sx  Referred By: Dr. Alvarez  Past Medical History Relevant to Rehab: arthritis, GERD, PONV  Prior to Session Communication: Bedside nurse  Patient Position Received: Bed, 3 rail up  General Comment: Agreeable to treatment, IV, RLE dressing intact.  Precautions:  Post-Surgical Precautions: Right total knee precautions  Pain:  Pain Assessment  Pain Score: 8  Pain Type: Surgical pain  Pain Location: Knee  Pain Orientation: Right  Pain Interventions: Cold pack (nurse medicated end of session)    Objective       Activities of Daily Living: Grooming  Grooming Level of Assistance: Close supervision  Grooming Where Assessed: Standing sinkside  Grooming Comments: hand hygiene    LE Dressing  LE Dressing Adaptive Equipment: Reacher, Sock aide  Pants Level of Assistance: Contact guard  Sock Level of Assistance: Close supervision  Compression Hose Level of Assistance: Maximum assistance  LE Dressing  Where Assessed: Chair  LE Dressing Comments: Pt donned pants, doffed/donned socks using AE. Pt able to assist with [place] hose from knee up. Pt with slight LOB with attempts to complete clothing management. Therapist instructs in 3 point stance for improved stability. Therapist provides reacher, pt confirms having sock aid/LH shoe horn at home.    Toileting  Toileting Level of Assistance: Contact guard  Where Assessed: Other (Comment) (BSC over toilet)  Toileting Comments: hgyiene/clothing management  Functional Standing Tolerance:     Bed Mobility/Transfers: Bed Mobility 1  Bed Mobility 1: Supine to sitting  Level of Assistance 1: Close supervision  Bed Mobility Comments 1: HOB elevated, pt uses sheet for leg lasso    Transfer 1  Transfer From 1: Bed to  Transfer to 1: Toilet  Technique 1: To right  Transfer Device 1: Walker  Transfer Level of Assistance 1: Minimum assistance  Trials/Comments 1: cues for hand/foot placement to BSC over toilet  Transfers 2  Transfer From 2: Toilet to  Transfer to 2: Chair with arms  Technique 2: To left  Transfer Device 2: Walker  Transfer Level of Assistance 2: Minimum assistance  Trials/Comments 2: cues for backing up fully to chair, safe hand/foot placement    Tub Transfers  Tub Transfers Comments: Therapist educates in correct form, pt reporting having TTB ordered for home.    Car Transfers  Car Transfers Comments: Therapist educates in correct form      Ambulation/Gait Training:  Ambulation/Gait Training  Ambulation/Gait Training Performed: Yes  Ambulation/Gait Training 1  Surface 1: Level tile  Device 1: Rolling walker  Assistance 1: Moderate assistance  Quality of Gait 1: Inconsistent stride length  Comments/Distance (ft) 1: 10 feet x2, max cues for step sequencing forward/backward, safe proximity of self to device, use of UE on walker to off weight RLE due to high pain.    Outcome Measures:[facility] Daily Activity  Putting on and taking off regular lower body clothing: A  little  Bathing (including washing, rinsing, drying): A little  Putting on and taking off regular upper body clothing: A little  Toileting, which includes using toilet, bedpan or urinal: A little  Taking care of personal grooming such as brushing teeth: None  Eating Meals: None  Daily Activity - Total Score: 20        Education Documentation  Handouts, taught by RICHARD Morley at 2/27/2024  9:31 AM.  Learner: Family, Patient  Readiness: Acceptance  Method: Explanation, Demonstration, Handout  Response: Verbalizes Understanding, Demonstrated Understanding, Needs Reinforcement    Body Mechanics, taught by RICHARD Morley at 2/27/2024  9:31 AM.  Learner: Family, Patient  Readiness: Acceptance  Method: Explanation, Demonstration, Handout  Response: Verbalizes Understanding, Demonstrated Understanding, Needs Reinforcement    Precautions, taught by RICHARD Morley at 2/27/2024  9:31 AM.  Learner: Family, Patient  Readiness: Acceptance  Method: Explanation, Demonstration, Handout  Response: Verbalizes Understanding, Demonstrated Understanding, Needs Reinforcement    ADL Training, taught by RICHARD Morley at 2/27/2024  9:31 AM.  Learner: Family, Patient  Readiness: Acceptance  Method: Explanation, Demonstration, Handout  Response: Verbalizes Understanding, Demonstrated Understanding, Needs Reinforcement    Education Comments  No comments found.        IP EDUCATION:  Education  Individual(s) Educated: Patient, Child  Education Provided: Fall precautons, Other (therapist educates in correct form, 3 point stance, effective use of AE with LB dressing to increase safety, independence, adherence to knee precautions with LB dressing. Car/TTB transfer technique.)  Education Comment: Handouts provided for car transfer, knee precautions    Goals:  Encounter Problems                          OT Goals       ADL's (Progressing)       Start:  02/26/24    Expected End:  03/11/24       Pt will complete bathing, dressing and  grooming tasks w/ mod I w/ AE/ compensatory tech as needed for ease, safety and increased independence in self care tasks.         Functional transfers (Progressing)       Start:  02/26/24    Expected End:  03/11/24       Pt will demon. Bed, chair and toilet transfers w/ mod I w/ LRD for safety and increased independence in functional transfers.         Precautions (Progressing)       Start:  02/26/24    Expected End:  03/11/24       Pt will verbalize/ demon. Surgical precautions for safety and increased independence in daily activities and functional mobility            Safety       Safe Mobility Techniques (Progressing)       Start:  02/26/24    Expected End:  03/11/24       Pt will correctly identify and demonstrate safe mobility techniques to reduce their risks for falls during their acute care stay            Joint Precaution Education (Progressing)       Start:  02/26/24    Expected End:  03/11/24       Pt will correctly verbalize 100% of their post op precautions and correctly demonstrate these during functional movement without cuing needs from therapist              Transfers       Supine to sit (Progressing)       Start:  02/26/24    Expected End:  03/11/24       Pt will transfer supine to sitting at edge of bed with modified independent assist to promote acute care out of bed activity           Sit to stand (Progressing)       Start:  02/26/24    Expected End:  03/11/24       Pt will transfer sit to standing position with modified independent assist and walker to promote safe out of bed activity           Bed to chair (Progressing)       Start:  02/26/24    Expected End:  03/11/24       Pt will transfer from sitting edge of bed to the chair with modified independent assist and walker to promote out of bed activity and reduce the risks of prolonged acute care bedrest

## 2024-02-27 NOTE — PROGRESS NOTES
Physical Therapy    Physical Therapy Treatment    Patient Name: Johanna Fair  MRN: [id number]  Today's Date: 2/27/2024  Time Calculation  Start Time: 0855  Stop Time: 0945  Time Calculation (min): 50 min       Assessment/Plan   PT Assessment  PT Assessment Results: Decreased strength, Decreased endurance, Decreased range of motion, Impaired balance, Decreased mobility, Decreased coordination, Decreased cognition, Impaired judgement, Decreased safety awareness, Decreased skin integrity, Orthopedic restrictions, Pain  Rehab Prognosis: Good  End of Session Communication: Bedside nurse  End of Session Patient Position: Bed, 3 rail up, Alarm off, not on at start of session     PT Plan  Treatment/Interventions: Bed mobility, Transfer training, Gait training, Stair training, Therapeutic exercise  PT Plan: Skilled PT  PT Frequency: BID  PT Discharge Recommendations: Low intensity level of continued care  Equipment Recommended upon Discharge: Wheeled walker  PT Recommended Transfer Status: Assist x1, Assistive device  PT - OK to Discharge: Yes      General Visit Information:   PT  Visit  PT Received On: 02/27/24  General  Family/Caregiver Present: Yes  Caregiver Feedback: Spouse, dtr  Prior to Session Communication: Bedside nurse  Patient Position Received: Up in chair  Preferred Learning Style: verbal  General Comment: Pt agreeable to therapy.    Subjective   Precautions:  Precautions  LE Weight Bearing Status: Weight Bearing as Tolerated  Medical Precautions: Fall precautions  Post-Surgical Precautions: Right total knee precautions  Vital Signs:       Objective   Pain:  Pain Assessment  Pain Score: 7  Pain Type: Surgical pain  Pain Location: Knee  Pain Orientation: Right  Pain Interventions: Cold pack  Cognition:     Postural Control:     Extremity/Trunk Assessments:    Activity Tolerance:     Treatments:  Therapeutic Exercise  Therapeutic Exercise Performed: Yes  Therapeutic Exercise Activity 1: Pt performed seated bilat LE  ankle pumps, glute sets, LAQ, hip flexion, cornelia hip adduction and resisted hip abduction x15 reps each. Right LE seated heel slides x15 reps.    Bed Mobility 1  Bed Mobility 1: Supine to sitting  Level of Assistance 1: Minimum assistance  Bed Mobility Comments 1: Pt needed min assist with right LE onto bed sit to supine.    Ambulation/Gait Training 1  Surface 1: Level tile  Device 1: Rolling walker  Assistance 1: Contact guard  Quality of Gait 1: Decreased step length  Comments/Distance (ft) 1: Pt ambulated with RW ~20' x1 and 90' x1 CGA. Pt demonstrated slow, step to gait pattern sequencing. Mod antalgic gait.  Transfer 1  Transfer From 1: Chair with arms to  Transfer to 1: Stand  Technique 1: Sit to stand  Transfer Device 1: Walker  Transfer Level of Assistance 1: Close supervision  Transfers 2  Transfer From 2: Stand to  Transfer to 2: Sit, Bed  Technique 2: Stand to sit  Transfer Level of Assistance 2: Close supervision    Stairs  Stairs: Yes  Stairs  Curb Step 1: No  Device 1: Railing, Single point cane  Assistance 1: Contact guard, Minimal verbal cues  Comment/Number of Steps 1: Up/down 4 steps with one rail and straight cane, CGA and verbal cues for cane placement and sequencing non reciprocal pattern.    Outcome Measures:  [facility] Basic Mobility  Turning from your back to your side while in a flat bed without using bedrails: A little  Moving from lying on your back to sitting on the side of a flat bed without using bedrails: A little  Moving to and from bed to chair (including a wheelchair): A little  Standing up from a chair using your arms (e.g. wheelchair or bedside chair): A little  To walk in hospital room: A little  Climbing 3-5 steps with railing: A little  Basic Mobility - Total Score: 18    Education Documentation  Handouts, taught by Shayy Avery PTA at 2/27/2024  9:59 AM.  Learner: Patient  Readiness: Acceptance  Method: Explanation  Response: Verbalizes Understanding    Precautions, taught by  Shayy Avery PTA at 2/27/2024  9:59 AM.  Learner: Patient  Readiness: Acceptance  Method: Explanation  Response: Verbalizes Understanding    Body Mechanics, taught by Shayy Avery PTA at 2/27/2024  9:59 AM.  Learner: Patient  Readiness: Acceptance  Method: Explanation  Response: Verbalizes Understanding    Home Exercise Program, taught by Shayy Avery PTA at 2/27/2024  9:59 AM.  Learner: Patient  Readiness: Acceptance  Method: Explanation  Response: Verbalizes Understanding    Mobility Training, taught by Shayy Avery PTA at 2/27/2024  9:59 AM.  Learner: Patient  Readiness: Acceptance  Method: Explanation  Response: Verbalizes Understanding    Education Comments  No comments found.        OP EDUCATION:  Outpatient Education  Individual(s) Educated: Patient  Education Provided: Home Exercise Program, Post-Op Precautions  Equipment: Thera-Band    Encounter Problems       Encounter Problems (Active)       Balance       Sitting Balance (Progressing)       Start:  02/26/24    Expected End:  03/11/24       Pt will demonstrate good sitting balance to promote safe engagement with out of bed activities           Standing Balance (Progressing)       Start:  02/26/24    Expected End:  03/11/24       Pt will demonstrate good static standing balance to promote safe participation with out of bed activity, transfers, and mobility              Mobility       Ambulation (Progressing)       Start:  02/26/24    Expected End:  03/11/24       Pt will ambulate 50' modified independent assist with walker to promote safe home mobility           Entry Stair Negotiation (Progressing)       Start:  02/26/24    Expected End:  03/11/24       Pt will ascend/descend 2 stairs, no rails, and walker with modified independent assist to safely enter/exit the home environment              Safety       Safe Mobility Techniques (Progressing)       Start:  02/26/24    Expected End:  03/11/24       Pt will correctly identify and demonstrate safe  mobility techniques to reduce their risks for falls during their acute care stay            Joint Precaution Education (Progressing)       Start:  02/26/24    Expected End:  03/11/24       Pt will correctly verbalize 100% of their post op precautions and correctly demonstrate these during functional movement without cuing needs from therapist              Transfers       Supine to sit (Progressing)       Start:  02/26/24    Expected End:  03/11/24       Pt will transfer supine to sitting at edge of bed with modified independent assist to promote acute care out of bed activity           Sit to stand (Progressing)       Start:  02/26/24    Expected End:  03/11/24       Pt will transfer sit to standing position with modified independent assist and walker to promote safe out of bed activity           Bed to chair (Progressing)       Start:  02/26/24    Expected End:  03/11/24       Pt will transfer from sitting edge of bed to the chair with modified independent assist and walker to promote out of bed activity and reduce the risks of prolonged acute care bedrest

## 2024-02-28 ENCOUNTER — HOME CARE VISIT (OUTPATIENT)
Dept: HOME HEALTH SERVICES | Facility: HOME HEALTH | Age: 79
End: 2024-02-28
Payer: MEDICARE

## 2024-02-28 VITALS
TEMPERATURE: 98.2 F | OXYGEN SATURATION: 96 % | HEART RATE: 87 BPM | DIASTOLIC BLOOD PRESSURE: 82 MMHG | BODY MASS INDEX: 30.92 KG/M2 | HEIGHT: 67 IN | SYSTOLIC BLOOD PRESSURE: 138 MMHG | RESPIRATION RATE: 18 BRPM | WEIGHT: 197 LBS

## 2024-02-28 PROCEDURE — 1090000001 HH PPS REVENUE CREDIT

## 2024-02-28 PROCEDURE — 169592 NO-PAY CLAIM PROCEDURE

## 2024-02-28 PROCEDURE — G0151 HHCP-SERV OF PT,EA 15 MIN: HCPCS | Mod: HHH

## 2024-02-28 PROCEDURE — 0023 HH SOC

## 2024-02-28 PROCEDURE — 1090000002 HH PPS REVENUE DEBIT

## 2024-02-28 ASSESSMENT — ENCOUNTER SYMPTOMS
PAIN: 1
LOWEST PAIN SEVERITY IN PAST 24 HOURS: 6/10
PAIN LOCATION: RIGHT KNEE
PERSON REPORTING PAIN: PATIENT
OCCASIONAL FEELINGS OF UNSTEADINESS: 1
HIGHEST PAIN SEVERITY IN PAST 24 HOURS: 6/10
PAIN SEVERITY GOAL: 0/10
SUBJECTIVE PAIN PROGRESSION: UNCHANGED
LOWER EXTREMITY EDEMA: 1

## 2024-02-28 ASSESSMENT — ACTIVITIES OF DAILY LIVING (ADL)
ENTERING_EXITING_HOME: CONTACT GUARD ASSIST
OASIS_M1830: 05
AMBULATION_DISTANCE/DURATION_TOLERATED: 25
AMBULATION ASSISTANCE ON FLAT SURFACES: 1

## 2024-02-28 NOTE — CASE COMMUNICATION
Patient admitted for home care and PT services. pt daughter concerned that bandage was to be removed in two weeks, but the appointment is March 19th per daughter for follow up.

## 2024-02-29 PROCEDURE — 1090000001 HH PPS REVENUE CREDIT

## 2024-02-29 PROCEDURE — 1090000002 HH PPS REVENUE DEBIT

## 2024-03-01 ENCOUNTER — HOME CARE VISIT (OUTPATIENT)
Dept: HOME HEALTH SERVICES | Facility: HOME HEALTH | Age: 79
End: 2024-03-01
Payer: MEDICARE

## 2024-03-01 PROCEDURE — 1090000001 HH PPS REVENUE CREDIT

## 2024-03-01 PROCEDURE — 1090000002 HH PPS REVENUE DEBIT

## 2024-03-01 PROCEDURE — G0157 HHC PT ASSISTANT EA 15: HCPCS | Mod: HHH

## 2024-03-02 PROCEDURE — 1090000001 HH PPS REVENUE CREDIT

## 2024-03-02 PROCEDURE — 1090000002 HH PPS REVENUE DEBIT

## 2024-03-02 ASSESSMENT — ENCOUNTER SYMPTOMS
SUBJECTIVE PAIN PROGRESSION: GRADUALLY IMPROVING
PAIN LOCATION: RIGHT KNEE
HIGHEST PAIN SEVERITY IN PAST 24 HOURS: 6/10
PAIN LOCATION - PAIN SEVERITY: 3/10
PAIN: 1
PERSON REPORTING PAIN: PATIENT
PAIN SEVERITY GOAL: 0/10
LOWEST PAIN SEVERITY IN PAST 24 HOURS: 2/10

## 2024-03-02 ASSESSMENT — PAIN SCALES - PAIN ASSESSMENT IN ADVANCED DEMENTIA (PAINAD)
CONSOLABILITY: 0 - NO NEED TO CONSOLE.
NEGVOCALIZATION: 0
BODYLANGUAGE: 0 - RELAXED.
BREATHING: 0
TOTALSCORE: 0
FACIALEXPRESSION: 0 - SMILING OR INEXPRESSIVE.
BODYLANGUAGE: 0
FACIALEXPRESSION: 0
CONSOLABILITY: 0
NEGVOCALIZATION: 0 - NONE.

## 2024-03-03 PROCEDURE — 1090000001 HH PPS REVENUE CREDIT

## 2024-03-03 PROCEDURE — 1090000002 HH PPS REVENUE DEBIT

## 2024-03-04 ENCOUNTER — HOME CARE VISIT (OUTPATIENT)
Dept: HOME HEALTH SERVICES | Facility: HOME HEALTH | Age: 79
End: 2024-03-04
Payer: MEDICARE

## 2024-03-04 VITALS
OXYGEN SATURATION: 97 % | SYSTOLIC BLOOD PRESSURE: 136 MMHG | DIASTOLIC BLOOD PRESSURE: 78 MMHG | RESPIRATION RATE: 18 BRPM | HEART RATE: 67 BPM | TEMPERATURE: 96.5 F

## 2024-03-04 PROCEDURE — 1090000001 HH PPS REVENUE CREDIT

## 2024-03-04 PROCEDURE — G0157 HHC PT ASSISTANT EA 15: HCPCS | Mod: HHH

## 2024-03-04 PROCEDURE — 1090000002 HH PPS REVENUE DEBIT

## 2024-03-04 ASSESSMENT — ENCOUNTER SYMPTOMS
PERSON REPORTING PAIN: PATIENT
HIGHEST PAIN SEVERITY IN PAST 24 HOURS: 7/10
PAIN SEVERITY GOAL: 0/10
PAIN LOCATION - PAIN SEVERITY: 3/10
LOWEST PAIN SEVERITY IN PAST 24 HOURS: 2/10
PAIN: 1
PAIN LOCATION: RIGHT KNEE
SUBJECTIVE PAIN PROGRESSION: GRADUALLY IMPROVING

## 2024-03-04 ASSESSMENT — PAIN SCALES - PAIN ASSESSMENT IN ADVANCED DEMENTIA (PAINAD)
BODYLANGUAGE: 0 - RELAXED.
FACIALEXPRESSION: 0 - SMILING OR INEXPRESSIVE.
CONSOLABILITY: 0
CONSOLABILITY: 0 - NO NEED TO CONSOLE.
TOTALSCORE: 0
NEGVOCALIZATION: 0 - NONE.
BREATHING: 0
FACIALEXPRESSION: 0
NEGVOCALIZATION: 0
BODYLANGUAGE: 0

## 2024-03-04 NOTE — CASE COMMUNICATION
PATIENT WILL NEED A REFILL OF HYDROCODONE 5-325 MG AFTER THIS THURS 3/7 NIGHT. PATIENT USES [facility] IN [place].  PATIENT ALSO REPORTS TAKING ALEVE, ONE PILL, 220 MG, INBETWEEN HYDROCODONE DOSES.

## 2024-03-05 PROCEDURE — 1090000001 HH PPS REVENUE CREDIT

## 2024-03-05 PROCEDURE — 1090000002 HH PPS REVENUE DEBIT

## 2024-03-06 PROCEDURE — 1090000002 HH PPS REVENUE DEBIT

## 2024-03-06 PROCEDURE — 1090000001 HH PPS REVENUE CREDIT

## 2024-03-07 ENCOUNTER — HOME CARE VISIT (OUTPATIENT)
Dept: HOME HEALTH SERVICES | Facility: HOME HEALTH | Age: 79
End: 2024-03-07
Payer: MEDICARE

## 2024-03-07 VITALS
TEMPERATURE: 97.4 F | OXYGEN SATURATION: 96 % | SYSTOLIC BLOOD PRESSURE: 118 MMHG | RESPIRATION RATE: 18 BRPM | HEART RATE: 80 BPM | DIASTOLIC BLOOD PRESSURE: 74 MMHG

## 2024-03-07 PROCEDURE — 1090000002 HH PPS REVENUE DEBIT

## 2024-03-07 PROCEDURE — 1090000001 HH PPS REVENUE CREDIT

## 2024-03-07 PROCEDURE — G0157 HHC PT ASSISTANT EA 15: HCPCS | Mod: HHH

## 2024-03-07 ASSESSMENT — ENCOUNTER SYMPTOMS
PERSON REPORTING PAIN: PATIENT
PAIN SEVERITY GOAL: 0/10
HIGHEST PAIN SEVERITY IN PAST 24 HOURS: 5/10
LOWEST PAIN SEVERITY IN PAST 24 HOURS: 2/10
SUBJECTIVE PAIN PROGRESSION: GRADUALLY IMPROVING
PAIN: 1
PAIN LOCATION - PAIN SEVERITY: 2/10
PAIN LOCATION: RIGHT KNEE

## 2024-03-07 ASSESSMENT — PAIN SCALES - PAIN ASSESSMENT IN ADVANCED DEMENTIA (PAINAD)
FACIALEXPRESSION: 0
CONSOLABILITY: 0
NEGVOCALIZATION: 0
CONSOLABILITY: 0 - NO NEED TO CONSOLE.
FACIALEXPRESSION: 0 - SMILING OR INEXPRESSIVE.
BREATHING: 0
NEGVOCALIZATION: 0 - NONE.
BODYLANGUAGE: 0
BODYLANGUAGE: 0 - RELAXED.
TOTALSCORE: 0

## 2024-03-08 PROCEDURE — 1090000001 HH PPS REVENUE CREDIT

## 2024-03-08 PROCEDURE — 1090000002 HH PPS REVENUE DEBIT

## 2024-03-09 PROCEDURE — 1090000001 HH PPS REVENUE CREDIT

## 2024-03-09 PROCEDURE — 1090000002 HH PPS REVENUE DEBIT

## 2024-03-10 PROCEDURE — 1090000002 HH PPS REVENUE DEBIT

## 2024-03-10 PROCEDURE — 1090000001 HH PPS REVENUE CREDIT

## 2024-03-11 ENCOUNTER — HOME CARE VISIT (OUTPATIENT)
Dept: HOME HEALTH SERVICES | Facility: HOME HEALTH | Age: 79
End: 2024-03-11
Payer: MEDICARE

## 2024-03-11 VITALS
HEART RATE: 75 BPM | TEMPERATURE: 96.7 F | DIASTOLIC BLOOD PRESSURE: 70 MMHG | SYSTOLIC BLOOD PRESSURE: 116 MMHG | OXYGEN SATURATION: 96 % | RESPIRATION RATE: 18 BRPM

## 2024-03-11 PROCEDURE — 1090000001 HH PPS REVENUE CREDIT

## 2024-03-11 PROCEDURE — G0157 HHC PT ASSISTANT EA 15: HCPCS | Mod: HHH

## 2024-03-11 PROCEDURE — 1090000002 HH PPS REVENUE DEBIT

## 2024-03-11 ASSESSMENT — PAIN SCALES - PAIN ASSESSMENT IN ADVANCED DEMENTIA (PAINAD)
FACIALEXPRESSION: 0
BREATHING: 0
CONSOLABILITY: 0 - NO NEED TO CONSOLE.
NEGVOCALIZATION: 0
BODYLANGUAGE: 0 - RELAXED.
NEGVOCALIZATION: 0 - NONE.
TOTALSCORE: 0
CONSOLABILITY: 0
FACIALEXPRESSION: 0 - SMILING OR INEXPRESSIVE.
BODYLANGUAGE: 0

## 2024-03-11 ASSESSMENT — ENCOUNTER SYMPTOMS
PAIN LOCATION: RIGHT KNEE
PAIN: 1
LOWEST PAIN SEVERITY IN PAST 24 HOURS: 1/10
SUBJECTIVE PAIN PROGRESSION: GRADUALLY IMPROVING
PAIN LOCATION - PAIN SEVERITY: 1/10
PERSON REPORTING PAIN: PATIENT
HIGHEST PAIN SEVERITY IN PAST 24 HOURS: 3/10
PAIN SEVERITY GOAL: 0/10

## 2024-03-12 PROCEDURE — 1090000001 HH PPS REVENUE CREDIT

## 2024-03-12 PROCEDURE — 1090000002 HH PPS REVENUE DEBIT

## 2024-03-13 PROCEDURE — 1090000001 HH PPS REVENUE CREDIT

## 2024-03-13 PROCEDURE — 1090000002 HH PPS REVENUE DEBIT

## 2024-03-14 ENCOUNTER — HOME CARE VISIT (OUTPATIENT)
Dept: HOME HEALTH SERVICES | Facility: HOME HEALTH | Age: 79
End: 2024-03-14
Payer: MEDICARE

## 2024-03-14 PROCEDURE — 1090000001 HH PPS REVENUE CREDIT

## 2024-03-14 PROCEDURE — 1090000002 HH PPS REVENUE DEBIT

## 2024-03-15 ENCOUNTER — HOME CARE VISIT (OUTPATIENT)
Dept: HOME HEALTH SERVICES | Facility: HOME HEALTH | Age: 79
End: 2024-03-15
Payer: MEDICARE

## 2024-03-15 VITALS
TEMPERATURE: 97.2 F | OXYGEN SATURATION: 97 % | RESPIRATION RATE: 18 BRPM | HEART RATE: 88 BPM | DIASTOLIC BLOOD PRESSURE: 80 MMHG | SYSTOLIC BLOOD PRESSURE: 139 MMHG

## 2024-03-15 PROCEDURE — 1090000002 HH PPS REVENUE DEBIT

## 2024-03-15 PROCEDURE — G0151 HHCP-SERV OF PT,EA 15 MIN: HCPCS | Mod: HHH

## 2024-03-15 PROCEDURE — 1090000001 HH PPS REVENUE CREDIT

## 2024-03-15 SDOH — HEALTH STABILITY: PHYSICAL HEALTH: EXERCISE TYPE: HEP

## 2024-03-15 ASSESSMENT — ENCOUNTER SYMPTOMS
DENIES PAIN: 1
PERSON REPORTING PAIN: PATIENT

## 2024-03-15 ASSESSMENT — ACTIVITIES OF DAILY LIVING (ADL)
HOME_HEALTH_OASIS: 01
OASIS_M1830: 01

## 2024-03-15 NOTE — CASE COMMUNICATION
Patient discharged from PT services and home health care services and is transitioning to op monday at [facility] op clinic

## 2024-03-18 ENCOUNTER — EVALUATION (OUTPATIENT)
Dept: PHYSICAL THERAPY | Facility: HOSPITAL | Age: 79
End: 2024-03-18
Payer: MEDICARE

## 2024-03-18 DIAGNOSIS — M25.561 RIGHT KNEE PAIN: Primary | ICD-10-CM

## 2024-03-18 DIAGNOSIS — Z96.651 H/O TOTAL KNEE REPLACEMENT, RIGHT: ICD-10-CM

## 2024-03-18 PROCEDURE — 97110 THERAPEUTIC EXERCISES: CPT | Mod: GP | Performed by: PHYSICAL THERAPIST

## 2024-03-18 PROCEDURE — 97161 PT EVAL LOW COMPLEX 20 MIN: CPT | Mod: GP | Performed by: PHYSICAL THERAPIST

## 2024-03-18 ASSESSMENT — PAIN - FUNCTIONAL ASSESSMENT: PAIN_FUNCTIONAL_ASSESSMENT: 0-10

## 2024-03-18 ASSESSMENT — PAIN SCALES - GENERAL: PAINLEVEL_OUTOF10: 0 - NO PAIN

## 2024-03-18 ASSESSMENT — ENCOUNTER SYMPTOMS
LOSS OF SENSATION IN FEET: 0
OCCASIONAL FEELINGS OF UNSTEADINESS: 0
DEPRESSION: 0

## 2024-03-18 ASSESSMENT — PATIENT HEALTH QUESTIONNAIRE - PHQ9
2. FEELING DOWN, DEPRESSED OR HOPELESS: NOT AT ALL
1. LITTLE INTEREST OR PLEASURE IN DOING THINGS: NOT AT ALL
SUM OF ALL RESPONSES TO PHQ9 QUESTIONS 1 AND 2: 0

## 2024-03-18 ASSESSMENT — COLUMBIA-SUICIDE SEVERITY RATING SCALE - C-SSRS
6. HAVE YOU EVER DONE ANYTHING, STARTED TO DO ANYTHING, OR PREPARED TO DO ANYTHING TO END YOUR LIFE?: NO
1. IN THE PAST MONTH, HAVE YOU WISHED YOU WERE DEAD OR WISHED YOU COULD GO TO SLEEP AND NOT WAKE UP?: NO
2. HAVE YOU ACTUALLY HAD ANY THOUGHTS OF KILLING YOURSELF?: NO

## 2024-03-18 NOTE — PROGRESS NOTES
Physical Therapy  Physical Therapy Orthopedic Evaluation    Patient Name: Johanna Fair  MRN: [id number]  Today's Date: 3/18/2024  Time Calculation  Start Time: 1006  Stop Time: 1044  Time Calculation (min): 38 min    Today's Charges  PT Evaluation Time Entry  PT Evaluation (Low) Time Entry: 25  PT Therapeutic Procedures Time Entry  Therapeutic Exercise Time Entry: 13      Insurance:  Visit number: 1 of [place]  Authorization info: No authorization required  Insurance Type: Medicare/AARP    Current Problem  1. Right knee pain  Referral to Physical Therapy    Follow Up In Physical Therapy      2. H/O total knee replacement, right  Referral to Physical Therapy          General:  General  Reason for Referral: R TKR 02/26/2024  Referred By: Dr. Alvarez  Past Medical History Relevant to Rehab: Gall bladder surgery, hysterectomy, hiatal hernia repair, L foot surgery for 5th digit, cyst R breast, L foot skin cancer remission  Preferred Learning Style: verbal, visual, written      Precautions:   Precautions  STEADI Fall Risk Score (The score of 4 or more indicates an increased risk of falling): 2  Post-Surgical Precautions: Right total knee precautions    Medical History Form: Reviewed (scanned into chart)    Subjective:   Subjective   Chief Complaint: Patient presents to clinic s/p R TKR due to OA.  Onset Date: 02/26/2024  KELVIN: Insidious    Current Condition:   Better    Pain:  Pain Assessment: 0-10  Pain Score: 0 - No pain  Pain Type: Surgical pain  Location: R lateral and medial knee  Description: burning and aching  Aggravating Factors:  Standing, Walking, Sitting, and Stairs  Relieving Factors:  Rest, Compression, and Ice    Relevant Information (PMH & Previous Tests/Imaging): R knee OA  Previous Interventions/Treatments: Physical Therapy(home PT)    Prior Level of Function (PLOF)  Patient previously independent with all ADLs. Did not require assistive device prior to surgery.  Exercise/Physical Activity: Enjoys  walking  Work/School: Retired    Patients Living Environment: Reviewed and no concern. Lives one level with basement (rails on both sides). Hasn't been down the steps yet. Lives with her . Daughter is staying with the patient to help her. Unable to drive for 6 weeks, so the patient's daughter is driving her.    Primary Language: English    Patient's Goal(s) for Therapy: The patient would like to be able to go down her basement steps independently, drive, and walk community distances independently.    Red Flags: Do you have any of the following? No  Fever/chills, unexplained weight changes, dizziness/fainting, unexplained change in bowel or bladder functions, unexplained malaise or muscle weakness, night pain/sweats, numbness or tingling    Objective:  Objective        03/18/24 1000   Specific Lower Extremity MMT   R Iliopsoas: (5/5) 4/5   L Iliopsoas: (5/5) 4+/5   R Gluteals (sidelying): (5/5) seated 4/5   L Gluteals (sidelying): (5/5) seated 4+/5   R knee flexion: (5/5) 4/5   L knee flexion: (5/5) 5/5   R knee extension: (5/5) 4/5   L knee extension: (5/5) 5/5   Flexibility   R hamstrings Mod. Restrict.   Knee Observation   Observation Comment R anterior knee incision covered by bandage- surgeon removing tomorrow at F/U. No redness or S/S of infection. Mild global R knee edema.   Knee AROM   R knee flexion: (140°) 106   L knee flexion: (140°) 125   R knee extension: (0°) lacks 8 degrees to neutral   L knee extension: (0°) lacks 1 degree to neutral   Flexibility   Flexibility Comment Min. Restrict. R gastrocs.       Lower Extremity Functional Movements  Transfers: Modified Independent  Gait:  Patient ambulating with mild antalgia, decreased R LE push off and heel strike, lacking full R TKE during midstance  Assistive Device walker      Outcome Measures:          EDUCATION:   Individual(s) Educated: Patient  Education Provided: Home exercise program, plan of care, activity modifications, pain management, and  injury pathology  Handout(s) Provided: Scanned into chart  Home Program: See below  Risk and Benefits Discussed with Patient/Caregiver/Other: Yes   Patient/Caregiver Demonstrated Understanding: Yes   Plan of Care Discussed and Agreed Upon: Yes   Patient Response to Education: Patient/Caregiver verbalized understanding of information, Patient/Caregiver performed return demonstration of exercises/activities, and Patient/Caregiver asked appropriate questions    Assessment: Patient presents to PT s/p R TKR on 02/26/2023 due to R knee OA, resulting in limited participation in pain-free ADLs and inability to perform at their prior level of function. The patient demonstrates decreased R LE strength, ROM, flexibility, balance, and gait. Skilled PT warranted to address the above stated impairments, so the patient can perform FA's without increased pain or difficulty.    PT Assessment Results: Decreased strength, Decreased range of motion, Decreased endurance, Impaired balance, Decreased mobility  Rehab Prognosis: Good    Clinical presentation: Stable and/or uncomplicated characteristics      Plan:  Treatment/Interventions: Cryotherapy, Education/ Instruction, Gait training, Manual therapy, Neuromuscular re-education, Therapeutic activities, Therapeutic exercises, Vasopneumatic device  PT Plan: Skilled PT  PT Frequency: 2 times per week  Duration: 5 weeks  Onset Date: 02/26/24  Certification Period Start Date: 03/18/24  Certification Period End Date: 04/29/24  Number of Treatments Authorized: 1 of 10  Rehab Potential: Good  Plan of Care Agreement: Patient      Goals: Set and discussed today  Active       PT Problem       Patient will be independent with HEP.        Start:  03/28/24    Expected End:  04/11/24            Patient will demonstrate >/= 0-110 degrees of R knee AROM to improve her ability to perform FA's.       Start:  03/28/24    Expected End:  04/18/24            Patient will demonstrate 5/5 with R knee MMT to  increase her ability to negotiate curbs and steps.       Start:  03/28/24    Expected End:  04/18/24            Patient will report no >/= 2/10 pain with sleeping.       Start:  03/28/24    Expected End:  04/18/24            Patient will score >/= 64/80 on LEFS to improve her function.       Start:  03/28/24    Expected End:  04/18/24                Plan of care was developed with input and agreement by the patient    Treatment Performed:  Scifit bike Seat 10 L1 x 5 min.  Access Code: [email or web address]  URL: [email or web address]  Date: 03/18/2024  Prepared by: Radha Monroy    Exercises  - Seated Hamstring Stretch  - 2 x daily - 7 x weekly - 3 sets - 1 reps - 30 second hold  - Seated Knee Extension Stretch with Chair  - 2 x daily - 7 x weekly - 1 sets - 1 reps - 3-5 minute hold      Radha Monroy, PT

## 2024-03-21 ENCOUNTER — TREATMENT (OUTPATIENT)
Dept: PHYSICAL THERAPY | Facility: HOSPITAL | Age: 79
End: 2024-03-21
Payer: MEDICARE

## 2024-03-21 DIAGNOSIS — M25.561 RIGHT KNEE PAIN: ICD-10-CM

## 2024-03-21 PROCEDURE — 97110 THERAPEUTIC EXERCISES: CPT | Mod: GP,CQ

## 2024-03-21 ASSESSMENT — PAIN - FUNCTIONAL ASSESSMENT: PAIN_FUNCTIONAL_ASSESSMENT: 0-10

## 2024-03-21 ASSESSMENT — PAIN SCALES - GENERAL: PAINLEVEL_OUTOF10: 0 - NO PAIN

## 2024-03-21 NOTE — PROGRESS NOTES
Physical Therapy Treatment    Patient Name: Johanna Fair  MRN: [id number]  Today's Date: 3/21/2024    Time: 1600 - 1648  Current Problem  1. Right knee pain  Follow Up In Physical Therapy          General  Reason for Referral: R TKR 02/26/2024  Referred By: Dr. Alvarez  Past Medical History Relevant to Rehab: Gall bladder surgery, hysterectomy, hiatal hernia repair, L foot surgery for 5th digit, cyst R breast, L foot skin cancer remission  Preferred Learning Style: verbal, visual, written    Subjective   Current Condition:   Better  Patient reports she is feeling good; she has no pain and is getting around well. Pt. Would like to get off of the walker.    Performing HEP?: Yes    Precautions  Precautions  STEADI Fall Risk Score (The score of 4 or more indicates an increased risk of falling): 2  Post-Surgical Precautions: Right total knee precautions    Pain  Pain Assessment: 0-10  Pain Score: 0 - No pain  Pain Type: Surgical pain    Objective   KNEE  Knee AROM   AAROM 3-108 degrees R knee    Gait    Pt. Able to walk with cane with good heel strike and toe off, and good knee flexion with follow through  Treatments:    Therapeutic Exercise  Therapeutic Exercise Performed: Yes  Therapeutic Exercise Activity 1: Scifit Seat 10 L1 x 5 min.  Therapeutic Exercise Activity 2: standing hip 3 way x 15 ea.  Therapeutic Exercise Activity 3: standing heel raises x 15  Therapeutic Exercise Activity 4: TKE with blue band x 10  Therapeutic Exercise Activity 5: fwd step up 4 inch x 10  Therapeutic Exercise Activity 6: lat. step up x 10 4 inch  Therapeutic Exercise Activity 7: LAQ x 10 ea. with 1#  Therapeutic Exercise Activity 8: heel slides 2 x 10 with/wo strap  Therapeutic Exercise Activity 9: QS 5 sec. hold x 10  Therapeutic Exercise Activity 10: QS with slr x 5  Therapeutic Exercise Activity 11: ambulate with cane 20 ft. x 2    Manual Therapy  Manual Therapy Performed: Yes  Manual Therapy Activity 1: light occilations  Manual Therapy  Activity 2: STM of calf quad, HS  EDUCATION:   Outpatient Education  Individual(s) Educated: Patient  Education Provided: Home Safety, Other (gait with cane)  Patient/Caregiver Demonstrated Understanding: yes  Patient Response to Education: Patient/Caregiver Verbalized Understanding of Information    Assessment: Pt. Able to tolerate all exercises and is doing very well for 3.5 weeks post op. Pt has demonstrated increased ROM and has no c/o pain. Pt is able to negotiate steps reciprocally and presents with a slight ext lag with SLR due to weakness. We will continue PT POC with focus on gait and ROM along with strengthening for return to normal functional activities.  PT Assessment  PT Assessment Results: Decreased strength, Decreased range of motion, Decreased endurance, Impaired balance, Decreased mobility  Rehab Prognosis: Good    Plan:  OP PT Plan  Treatment/Interventions: Cryotherapy, Education/ Instruction, Gait training, Manual therapy, Neuromuscular re-education, Therapeutic activities, Therapeutic exercises, Vasopneumatic device  PT Plan: Skilled PT  PT Frequency: 2 times per week  Duration: 5 weeks  Onset Date: 02/26/24  Certification Period Start Date: 03/18/24  Certification Period End Date: 04/29/24  Number of Treatments Authorized: 2 of 10  Rehab Potential: Good  Plan of Care Agreement: Patient    Goals:       Shannen Berg, PTA

## 2024-03-25 ENCOUNTER — TREATMENT (OUTPATIENT)
Dept: PHYSICAL THERAPY | Facility: HOSPITAL | Age: 79
End: 2024-03-25
Payer: MEDICARE

## 2024-03-25 DIAGNOSIS — M25.561 RIGHT KNEE PAIN: ICD-10-CM

## 2024-03-25 DIAGNOSIS — M25.561 ACUTE PAIN OF RIGHT KNEE: Primary | ICD-10-CM

## 2024-03-25 PROCEDURE — 97140 MANUAL THERAPY 1/> REGIONS: CPT | Mod: GP,CQ

## 2024-03-25 PROCEDURE — 97110 THERAPEUTIC EXERCISES: CPT | Mod: GP,CQ

## 2024-03-25 ASSESSMENT — PAIN - FUNCTIONAL ASSESSMENT: PAIN_FUNCTIONAL_ASSESSMENT: 0-10

## 2024-03-25 ASSESSMENT — PAIN SCALES - GENERAL: PAINLEVEL_OUTOF10: 0 - NO PAIN

## 2024-03-25 NOTE — PROGRESS NOTES
"  Physical Therapy Treatment    Patient Name: Johanna Fair  MRN: [id number]  Today's Date: 3/25/2024  Time Calculation  Start Time: 1129  Stop Time: 1210  Time Calculation (min): 41 min        PT Therapeutic Procedures Time Entry  Manual Therapy Time Entry: 8  Therapeutic Exercise Time Entry: 33                Current Problem  1. Acute pain of right knee        2. Right knee pain  Follow Up In Physical Therapy          General  Reason for Referral: R TKR 02/26/2024  Referred By: Dr. Alvarez  Past Medical History Relevant to Rehab: Gall bladder surgery, hysterectomy, hiatal hernia repair, L foot surgery for 5th digit, cyst R breast, L foot skin cancer remission    Subjective   Current Condition:   Better  Patient reports     Performing HEP?: Yes    Precautions  Precautions  Post-Surgical Precautions: Right total knee precautions  Pain  Pain Assessment: 0-10  Pain Score: 0 - No pain  Pain Type: Surgical pain    Objective   KNEE     Knee AROM Flexion R 114 degrees, Extension 0 degrees     Treatments:    Therapeutic Exercise  Therapeutic Exercise Performed: Yes  Therapeutic Exercise Activity 1: Scifit Seat 10 L1 x 5 min.  Therapeutic Exercise Activity 2: side step and retro walk 12' x2 each  Therapeutic Exercise Activity 3: standing heel raises x 20  Therapeutic Exercise Activity 5: fwd step up 4 inch x 10  Therapeutic Exercise Activity 6: lat. step up x 10 4 inch  Therapeutic Exercise Activity 7: LAQ x 20 ea. with 1 1/2#  Therapeutic Exercise Activity 8: heel slides 2 x 10 with/wo strap  Therapeutic Exercise Activity 9: QS 5 sec. hold x 10 with towel under the ankle  Therapeutic Exercise Activity 11: Airex 1/2 tandem 30\" R/L  Therapeutic Exercise Activity 12: S/L hip abduction x20  Therapeutic Exercise Activity 13: Sit to stand x  Therapeutic Exercise Activity 14: Hamstring curls Green x20            EDUCATION:   Individual(s) Educated: Patient  Education Provided: HEP  Risk and Benefits Discussed with " Patient/Caregiver/Other: Yes   Patient/Caregiver Demonstrated Understanding: Yes   Patient Response to Education: Patient/Caregiver verbalized understanding of information    Assessment: Pt demonstrated good tolerance with all TE's completed today, able to complete without c/o pain. Pt demonstrated good dynamic balance with side stepping and retro walk without UE support. Good increase noted with AROM knee flexion and extension today, with measurements taken.  PT Assessment  PT Assessment Results: Decreased strength, Decreased range of motion, Decreased endurance, Impaired balance, Decreased mobility  Rehab Prognosis: Good    Plan: Continue to progress current POC as tolerated to facilitate ability to perform functional activities. OP PT Plan  PT Plan: Skilled PT  PT Frequency: 2 times per week  Duration: 5 weeks  Onset Date: 02/26/24  Certification Period Start Date: 03/18/24  Certification Period End Date: 04/29/24  Number of Treatments Authorized: 3 of 10  Rehab Potential: Good    Goals:       Sudhakar Gregg, PTA

## 2024-03-28 ENCOUNTER — TREATMENT (OUTPATIENT)
Dept: PHYSICAL THERAPY | Facility: HOSPITAL | Age: 79
End: 2024-03-28
Payer: MEDICARE

## 2024-03-28 DIAGNOSIS — M25.561 RIGHT KNEE PAIN: ICD-10-CM

## 2024-03-28 PROCEDURE — 97110 THERAPEUTIC EXERCISES: CPT | Mod: GP | Performed by: PHYSICAL THERAPIST

## 2024-03-28 PROCEDURE — 97140 MANUAL THERAPY 1/> REGIONS: CPT | Mod: GP | Performed by: PHYSICAL THERAPIST

## 2024-03-28 ASSESSMENT — PAIN SCALES - GENERAL: PAINLEVEL_OUTOF10: 1

## 2024-03-28 ASSESSMENT — PAIN - FUNCTIONAL ASSESSMENT: PAIN_FUNCTIONAL_ASSESSMENT: 0-10

## 2024-03-28 ASSESSMENT — PAIN DESCRIPTION - DESCRIPTORS: DESCRIPTORS: ACHING

## 2024-03-28 NOTE — PROGRESS NOTES
"  Physical Therapy Treatment    Patient Name: Johanna Fair  MRN: [id number]  Today's Date: 3/28/2024  Time Calculation  Start Time: 1301  Stop Time: 1341  Time Calculation (min): 40 min        PT Therapeutic Procedures Time Entry  Manual Therapy Time Entry: 9  Therapeutic Exercise Time Entry: 31      Current Problem  1. Right knee pain  Follow Up In Physical Therapy          General  Reason for Referral: R TKR 02/26/2024  Referred By: Dr. Alvarez  Past Medical History Relevant to Rehab: Gall bladder surgery, hysterectomy, hiatal hernia repair, L foot surgery for 5th digit, cyst R breast, L foot skin cancer remission    Subjective   Current Condition:   Better  Patient reports she is doing her HEP daily. She is standing and doing more around the house. She did have a hard time sleeping last night because of R knee pain, but it is feeling better this morning.    Performing HEP?: Yes    Precautions  Precautions  Post-Surgical Precautions: Right total knee precautions  Pain  Pain Assessment: 0-10  Pain Score: 1  Pain Type: Surgical pain  Pain Descriptors: Aching    Objective   R knee AROM 0-118 degrees post manual    Treatments:    Therapeutic Exercise  Therapeutic Exercise Performed: Yes  Therapeutic Exercise Activity 1: Scifit Seat 10 L1 x 5 min.  Therapeutic Exercise Activity 2: Incline gastroc stretch x 60\"  Therapeutic Exercise Activity 3: Self flexion mobs on step x 2' 5 sec. hold  Therapeutic Exercise Activity 4: fwd step up 4 inch x 10  Therapeutic Exercise Activity 5: lat. step up x 10 4 inch  Therapeutic Exercise Activity 6: Standing R TKE with sm. ball at wall x 20 5\" hold  Therapeutic Exercise Activity 7: Sit to stand 2 x 10  Therapeutic Exercise Activity 8: side step and retro walk yellow loop at ankles 12' x2 each  Therapeutic Exercise Activity 9: Seated LAQ 3# ankle weights 2 x 10 ea. R/L  Therapeutic Exercise Activity 10: Hamstring curls blue x20  Therapeutic Exercise Activity 11: QS 5 sec. hold x 10 with " towel under the ankle  Therapeutic Exercise Activity 12: S/L hip abduction x20         Manual Therapy  Manual Therapy Performed: Yes  Manual Therapy Activity 1: R patellar mobs and extension mobs to increase mobility  Manual Therapy Activity 2: STM, scar mobilization distal aspect of the incision area      EDUCATION:   Individual(s) Educated: Patient  Education Provided: Gait training  Handout(s) Provided: Scanned into chart  Home Program: Continue with her current HEP  Risk and Benefits Discussed with Patient/Caregiver/Other: Yes   Patient/Caregiver Demonstrated Understanding: Yes   Patient Response to Education: Patient/Caregiver verbalized understanding of information, Patient/Caregiver performed return demonstration of exercises/activities, and Patient/Caregiver asked appropriate questions    Assessment:   The patient is making excellent progress towards her PT goals. She is using her std cane less at home but still uses it for community distances. The patient is at almost full R knee AROM and is demonstrating good improvement in her strength as well. The patient is becoming more confident with steps.  PT Assessment  PT Assessment Results: Decreased strength, Decreased range of motion, Decreased endurance, Impaired balance, Decreased mobility  Rehab Prognosis: Good    Plan: Continue with POC.   Continue with core stability, LE strengthening, ROM, flexibility, and balance, so the patient can perform FA's without increased pain or difficulty.     OP PT Plan  PT Plan: Skilled PT  PT Frequency: 2 times per week  Duration: 5 weeks  Onset Date: 02/26/24  Certification Period Start Date: 03/18/24  Certification Period End Date: 04/29/24  Number of Treatments Authorized: 4 of 10  Rehab Potential: Good    Goals:  Active       PT Problem       Patient will be independent with HEP.        Start:  03/28/24    Expected End:  04/11/24            Patient will demonstrate >/= 0-110 degrees of R knee AROM to improve her ability  to perform FA's.       Start:  03/28/24    Expected End:  04/18/24            Patient will demonstrate 5/5 with R knee MMT to increase her ability to negotiate curbs and steps.       Start:  03/28/24    Expected End:  04/18/24            Patient will report no >/= 2/10 pain with sleeping.       Start:  03/28/24    Expected End:  04/18/24            Patient will score >/= 64/80 on LEFS to improve her function.       Start:  03/28/24    Expected End:  04/18/24                 Radha Monroy, PT

## 2024-04-01 ENCOUNTER — TREATMENT (OUTPATIENT)
Dept: PHYSICAL THERAPY | Facility: HOSPITAL | Age: 79
End: 2024-04-01
Payer: MEDICARE

## 2024-04-01 DIAGNOSIS — M25.561 ACUTE PAIN OF RIGHT KNEE: Primary | ICD-10-CM

## 2024-04-01 DIAGNOSIS — M25.561 RIGHT KNEE PAIN: ICD-10-CM

## 2024-04-01 PROCEDURE — 97110 THERAPEUTIC EXERCISES: CPT | Mod: GP,CQ

## 2024-04-01 PROCEDURE — 97140 MANUAL THERAPY 1/> REGIONS: CPT | Mod: GP,CQ

## 2024-04-01 ASSESSMENT — PAIN SCALES - GENERAL: PAINLEVEL_OUTOF10: 0 - NO PAIN

## 2024-04-01 ASSESSMENT — PAIN - FUNCTIONAL ASSESSMENT: PAIN_FUNCTIONAL_ASSESSMENT: 0-10

## 2024-04-01 NOTE — PROGRESS NOTES
"  Physical Therapy Treatment    Patient Name: Johanna Fair  MRN: [id number]  Today's Date: 4/1/2024  Time Calculation  Start Time: 1130  Stop Time: 1210  Time Calculation (min): 40 min        PT Therapeutic Procedures Time Entry  Manual Therapy Time Entry: 10  Therapeutic Exercise Time Entry: 30                Current Problem  1. Acute pain of right knee        2. Right knee pain  Follow Up In Physical Therapy          General  Reason for Referral: R TKR 02/26/2024  Referred By: Dr. Alvarez  Past Medical History Relevant to Rehab: Gall bladder surgery, hysterectomy, hiatal hernia repair, L foot surgery for 5th digit, cyst R breast, L foot skin cancer remission    Subjective   Current Condition:   Better  Patient reports she was able to do a lot of standing yesterday without c/o increased pain. Pt states she was able to drive to her appointment today without a problem.     Performing HEP?: Yes    Precautions  Precautions  Post-Surgical Precautions: Right total knee precautions  Pain  Pain Assessment: 0-10  Pain Score: 0 - No pain  Pain Location: Knee  Pain Orientation: Right    Objective   KNEE    Knee AROM:  R Knee Flexion 116 degrees      Knee PROM     Treatments:    Therapeutic Exercise  Therapeutic Exercise Performed: Yes  Therapeutic Exercise Activity 1: Scifit Seat 10 L2 x 5 min.  Therapeutic Exercise Activity 2: Incline gastroc stretch x 60\"  Therapeutic Exercise Activity 4: fwd step up 4 inch x 15  Therapeutic Exercise Activity 5: lat. step up x 15 4 inch  Therapeutic Exercise Activity 6: Standing R TKE with sm. ball at wall x 20 5\" hold  Therapeutic Exercise Activity 7: Sit to stand x25  Therapeutic Exercise Activity 8: side step and retro walk yellow loop at ankles 12' x2 each  Therapeutic Exercise Activity 9: Seated LAQ 3# ankle weights 2 x 10 ea. R/L  Therapeutic Exercise Activity 10: Hamstring curls blue x20  Therapeutic Exercise Activity 11: SLS with opposite toe down 30\"  Therapeutic Exercise Activity 12: " "Airex 1/2 tandem 30\" R/L  Therapeutic Exercise Activity 13: standing hip ABD/Ext  Therapeutic Exercise Activity 14: Hamstring curls Green x20       Manual Therapy  Manual Therapy Performed: Yes  Manual Therapy Activity 1: R patellar mobs sup/inf to increase mobility  Manual Therapy Activity 2: STM, scar mobilization distal aspect of the incision area and medial hamstring to improve tissue mobility       EDUCATION:   Individual(s) Educated: Patient  Education Provided:   Risk and Benefits Discussed with Patient/Caregiver/Other: Yes   Patient/Caregiver Demonstrated Understanding: Yes   Patient Response to Education: Patient/Caregiver verbalized understanding of information    Assessment: Pt demonstrating good heel/toe gait when ambulating in the clinic without assistive device for support.  Pt demonstrated min/mod ankle strategy with SLS with opposite toe down and was able to perform for 30\" before LOB.   PT Assessment  PT Assessment Results: Decreased strength, Decreased range of motion, Decreased endurance, Impaired balance, Decreased mobility  Rehab Prognosis: Good    Plan: Continue to progress current POC as tolerated to facilitate ability to perform functional activities.   OP PT Plan  PT Plan: Skilled PT  PT Frequency: 2 times per week  Duration: 5 weeks  Onset Date: 02/26/24  Certification Period Start Date: 03/18/24  Certification Period End Date: 04/29/24  Number of Treatments Authorized: 5 of 10    Goals:  Active       PT Problem       Patient will be independent with HEP.        Start:  03/28/24    Expected End:  04/11/24            Patient will demonstrate >/= 0-110 degrees of R knee AROM to improve her ability to perform FA's.       Start:  03/28/24    Expected End:  04/18/24            Patient will demonstrate 5/5 with R knee MMT to increase her ability to negotiate curbs and steps.       Start:  03/28/24    Expected End:  04/18/24            Patient will report no >/= 2/10 pain with sleeping.       " Start:  03/28/24    Expected End:  04/18/24            Patient will score >/= 64/80 on LEFS to improve her function.       Start:  03/28/24    Expected End:  04/18/24                 Sudhakar Gregg, PTA

## 2024-04-04 ENCOUNTER — TREATMENT (OUTPATIENT)
Dept: PHYSICAL THERAPY | Facility: HOSPITAL | Age: 79
End: 2024-04-04
Payer: MEDICARE

## 2024-04-04 DIAGNOSIS — M25.561 RIGHT KNEE PAIN: ICD-10-CM

## 2024-04-04 PROCEDURE — 97140 MANUAL THERAPY 1/> REGIONS: CPT | Mod: GP | Performed by: PHYSICAL THERAPIST

## 2024-04-04 PROCEDURE — 97110 THERAPEUTIC EXERCISES: CPT | Mod: GP | Performed by: PHYSICAL THERAPIST

## 2024-04-04 ASSESSMENT — PAIN - FUNCTIONAL ASSESSMENT: PAIN_FUNCTIONAL_ASSESSMENT: 0-10

## 2024-04-04 ASSESSMENT — PAIN SCALES - GENERAL: PAINLEVEL_OUTOF10: 0 - NO PAIN

## 2024-04-04 NOTE — PROGRESS NOTES
"  Physical Therapy Treatment    Patient Name: Johanna Fair  MRN: [id number]  Today's Date: 4/4/2024  Time Calculation  Start Time: 1432  Stop Time: 1510  Time Calculation (min): 38 min    PT Therapeutic Procedures Time Entry  Manual Therapy Time Entry: 10  Therapeutic Exercise Time Entry: 28      Current Problem  1. Right knee pain  Follow Up In Physical Therapy          General  Reason for Referral: R TKR 02/26/2024  Referred By: Dr. Alvarez  Past Medical History Relevant to Rehab: Gall bladder surgery, hysterectomy, hiatal hernia repair, L foot surgery for 5th digit, cyst R breast, L foot skin cancer remission    Subjective   Current Condition:   Better  Patient reports that she doesn't have any significant pain in the R knee. She tends to only get stiffness/pain at night.    Performing HEP?: Yes    Precautions  Precautions  Post-Surgical Precautions: Right total knee precautions  Pain  Pain Assessment: 0-10  Pain Score: 0 - No pain  Pain Location: Knee  Pain Orientation: Right    Objective   R knee 1- 120 with OP    Treatments:    Therapeutic Exercise  Therapeutic Exercise Performed: Yes  Therapeutic Exercise Activity 1: Stepper L4 x 6' (bike not available)  Therapeutic Exercise Activity 2: Incline gastroc stretch x 60\"  Therapeutic Exercise Activity 3: standing hip ABD/Ext red band 2 x 10 ea. R/LO  Therapeutic Exercise Activity 4: side step and retro walk yellow loop at ankles 12' x2 each  Therapeutic Exercise Activity 5: lat. step up x 15 4 inch  Therapeutic Exercise Activity 6: fwd step up 4 inch x 15  Therapeutic Exercise Activity 7: Startrac 35# HS 2 x 10  Therapeutic Exercise Activity 8: Startrac 25# Leg extension 2 x 10  Therapeutic Exercise Activity 9: Standing R TKE with sm. ball at wall x 20 5\" hold  Therapeutic Exercise Activity 10: SLS with opposite toe down 30\"  Therapeutic Exercise Activity 11: STS without UE support x 20      Manual Therapy  Manual Therapy Performed: Yes  Manual Therapy Activity 1: R " patellar mobs sup/inf to increase mobility  Manual Therapy Activity 2: STM, scar mobilization distal aspect of the incision area and medial hamstring to improve tissue mobility    EDUCATION:   Individual(s) Educated: Patient  Education Provided: Discussed progression from PT to I HEP  Handout(s) Provided: Scanned into chart  Home Program: Continue to focus on R quad strength to improve R TKE  Risk and Benefits Discussed with Patient/Caregiver/Other: Yes   Patient/Caregiver Demonstrated Understanding: Yes   Patient Response to Education: Patient/Caregiver verbalized understanding of information, Patient/Caregiver performed return demonstration of exercises/activities, and Patient/Caregiver asked appropriate questions    Assessment:   The patient is ambulating without assistive device. Min. VC's to improve R TKE during midstance. Patient tends to ambulate with the R knee slightly flexed. Patient also requires min. VC's to slow down with TE's and focus on technique.  PT Assessment  PT Assessment Results: Decreased strength, Decreased range of motion, Decreased endurance, Impaired balance, Decreased mobility  Rehab Prognosis: Good    Plan: Continue with POC.  Continue with core stability, LE strengthening, ROM, flexibility, and balance, so the patient can perform FA's without increased pain or difficulty.     OP PT Plan  PT Plan: Skilled PT  PT Frequency: 2 times per week  Duration: 5 weeks  Onset Date: 02/26/24  Certification Period Start Date: 03/18/24  Certification Period End Date: 04/29/24  Number of Treatments Authorized: 6 of 10    Goals:  Active       PT Problem       Patient will be independent with HEP.        Start:  03/28/24    Expected End:  04/11/24            Patient will demonstrate >/= 0-110 degrees of R knee AROM to improve her ability to perform FA's.       Start:  03/28/24    Expected End:  04/18/24            Patient will demonstrate 5/5 with R knee MMT to increase her ability to negotiate curbs and  steps.       Start:  03/28/24    Expected End:  04/18/24            Patient will report no >/= 2/10 pain with sleeping.       Start:  03/28/24    Expected End:  04/18/24            Patient will score >/= 64/80 on LEFS to improve her function.       Start:  03/28/24    Expected End:  04/18/24                 Radha Monroy, PT

## 2024-04-08 ENCOUNTER — APPOINTMENT (OUTPATIENT)
Dept: PHYSICAL THERAPY | Facility: HOSPITAL | Age: 79
End: 2024-04-08
Payer: MEDICARE

## 2024-04-11 ENCOUNTER — TREATMENT (OUTPATIENT)
Dept: PHYSICAL THERAPY | Facility: HOSPITAL | Age: 79
End: 2024-04-11
Payer: MEDICARE

## 2024-04-11 DIAGNOSIS — M25.561 RIGHT KNEE PAIN: ICD-10-CM

## 2024-04-11 PROCEDURE — 97110 THERAPEUTIC EXERCISES: CPT | Mod: GP | Performed by: PHYSICAL THERAPIST

## 2024-04-11 PROCEDURE — 97140 MANUAL THERAPY 1/> REGIONS: CPT | Mod: GP | Performed by: PHYSICAL THERAPIST

## 2024-04-11 ASSESSMENT — PAIN SCALES - GENERAL: PAINLEVEL_OUTOF10: 0 - NO PAIN

## 2024-04-11 ASSESSMENT — PAIN - FUNCTIONAL ASSESSMENT: PAIN_FUNCTIONAL_ASSESSMENT: 0-10

## 2024-04-11 NOTE — PROGRESS NOTES
"  Physical Therapy Treatment    Patient Name: Johanna Fair  MRN: [id number]  Today's Date: 4/11/2024  Time Calculation  Start Time: 1120  Stop Time: 1202  Time Calculation (min): 42 min    PT Therapeutic Procedures Time Entry  Manual Therapy Time Entry: 10  Therapeutic Exercise Time Entry: 32      Current Problem  Problem List Items Addressed This Visit             ICD-10-CM    Right knee pain M25.561     1. Right knee pain  Follow Up In Physical Therapy          General  Reason for Referral: R TKR 02/26/2024  Referred By: Dr. Alvarez  Past Medical History Relevant to Rehab: Gall bladder surgery, hysterectomy, hiatal hernia repair, L foot surgery for 5th digit, cyst R breast, L foot skin cancer remission    Subjective   Current Condition:   Better  Patient reports that she is continuing to feel significantly better. She saw the surgeon, and he is pleased with her progress. The patient still has intermittent burning pain at night but it is improving as well. The patient is compliant with her HEP and feels ready to be discharged to her program Monday's session.    Performing HEP?: Yes    Precautions  Precautions  Post-Surgical Precautions: Right total knee precautions  Pain  Pain Assessment: 0-10  Pain Score: 0 - No pain  Pain Location: Knee  Pain Orientation: Right    Objective     R knee 0-120  degrees AROM post manual    Treatments:    Therapeutic Exercise  Therapeutic Exercise Performed: Yes  Therapeutic Exercise Activity 1: Scift L3 [place] x 5'  Therapeutic Exercise Activity 2: Incline gastroc stretch x 60\"  Therapeutic Exercise Activity 3: lat. step up x 15 4 inch  Therapeutic Exercise Activity 4: fwd step up 4 inch x 15  Therapeutic Exercise Activity 5: Startrac 35# HS 2 x 10  Therapeutic Exercise Activity 6: Startrac 25# Leg extension 2 x 10  Therapeutic Exercise Activity 7: standing hip ABD/Ext red band 2 x 10 ea. R/LO  Therapeutic Exercise Activity 8: side step and retro walk red loop at ankles 12' x2 " "each  Therapeutic Exercise Activity 9: R TKE black band with calf raise x 20  Therapeutic Exercise Activity 10: SLS with opposite toe down 30\"  Therapeutic Exercise Activity 11: STS without UE support x 20      Manual Therapy  Manual Therapy Performed: Yes  Manual Therapy Activity 1: R patellar mobs sup/inf to increase mobility  Manual Therapy Activity 2: STM, scar mobilization distal aspect of the incision area and medial hamstring to improve tissue mobility      EDUCATION:   Individual(s) Educated: Patient  Education Provided: Continue with HEP after discharge  Handout(s) Provided: Scanned into chart  Home Program: Reviewed her current HEP  Risk and Benefits Discussed with Patient/Caregiver/Other: Yes   Patient/Caregiver Demonstrated Understanding: Yes   Patient Response to Education: Patient/Caregiver verbalized understanding of information, Patient/Caregiver performed return demonstration of exercises/activities, and Patient/Caregiver asked appropriate questions    Assessment:   The patient is making excellent progress towards her PT goals. She has full R knee AROM now and her strength is almost full. The patient is regularly performing her HEP as instructed and is no longer using an assistive device.  PT Assessment  PT Assessment Results: Decreased strength, Decreased range of motion, Decreased endurance, Impaired balance, Decreased mobility  Rehab Prognosis: Good    Plan: Continue with POC.   Discharge to home program next session.  OP PT Plan  PT Plan: Skilled PT  PT Frequency: 2 times per week  Duration: 5 weeks  Onset Date: 02/26/24  Certification Period Start Date: 03/18/24  Certification Period End Date: 04/29/24  Number of Treatments Authorized: 7 of 10  Payor: MEDICARE / Plan: MEDICARE PART A AND B / Product Type: *No Product type* /     Goals:  Active       PT Problem       Patient will be independent with HEP.        Start:  03/28/24    Expected End:  04/11/24            Patient will demonstrate >/= " 0-110 degrees of R knee AROM to improve her ability to perform FA's.       Start:  03/28/24    Expected End:  04/18/24            Patient will demonstrate 5/5 with R knee MMT to increase her ability to negotiate curbs and steps.       Start:  03/28/24    Expected End:  04/18/24            Patient will report no >/= 2/10 pain with sleeping.       Start:  03/28/24    Expected End:  04/18/24            Patient will score >/= 64/80 on LEFS to improve her function.       Start:  03/28/24    Expected End:  04/18/24                 Radha Monroy, PT

## 2024-04-15 ENCOUNTER — TREATMENT (OUTPATIENT)
Dept: PHYSICAL THERAPY | Facility: HOSPITAL | Age: 79
End: 2024-04-15
Payer: MEDICARE

## 2024-04-15 DIAGNOSIS — M25.561 RIGHT KNEE PAIN: ICD-10-CM

## 2024-04-15 PROCEDURE — 97140 MANUAL THERAPY 1/> REGIONS: CPT | Mod: GP | Performed by: PHYSICAL THERAPIST

## 2024-04-15 PROCEDURE — 97110 THERAPEUTIC EXERCISES: CPT | Mod: GP | Performed by: PHYSICAL THERAPIST

## 2024-04-15 ASSESSMENT — PAIN SCALES - GENERAL: PAINLEVEL_OUTOF10: 0 - NO PAIN

## 2024-04-15 ASSESSMENT — PAIN - FUNCTIONAL ASSESSMENT: PAIN_FUNCTIONAL_ASSESSMENT: 0-10

## 2024-04-15 NOTE — PROGRESS NOTES
Physical Therapy Discharge and Treatment    Patient Name: Johanna Fair  MRN: [id number]  Today's Date: 4/15/2024  Time Calculation  Start Time: 1133  Stop Time: 1213  Time Calculation (min): 40 min    PT Therapeutic Procedures Time Entry  Manual Therapy Time Entry: 10  Therapeutic Exercise Time Entry: 30    Current Problem  Problem List Items Addressed This Visit             ICD-10-CM    Right knee pain M25.561     1. Right knee pain  Follow Up In Physical Therapy          General  Reason for Referral: R TKR 02/26/2024  Referred By: Dr. Alvarez  Past Medical History Relevant to Rehab: Gall bladder surgery, hysterectomy, hiatal hernia repair, L foot surgery for 5th digit, cyst R breast, L foot skin cancer remission    Subjective   Current Condition:   Better  Patient reports that she no longer has the burning pain at night. She is sitting, standing, and walking long periods without difficulty. The patient is having no difficulty going up and down her basement steps. The patient states that she is ready for discharge today's session.    Performing HEP?: Yes    Precautions  Precautions  Post-Surgical Precautions: Right total knee precautions  Pain  Pain Assessment: 0-10  Pain Score: 0 - No pain  Pain Location: Knee  Pain Orientation: Right    Objective      04/15/24 1147   Specific Lower Extremity MMT   R Iliopsoas: (5/5) 5/5   L Iliopsoas: (5/5) 5/5   R Gluteals (sidelying): (5/5) seated 4+/5   L Gluteals (sidelying): (5/5) seated 4+/5   R knee flexion: (5/5) 5/5   L knee flexion: (5/5) 5/5   R knee extension: (5/5) 5/5   L knee extension: (5/5) 5/5   Flexibility   R hamstrings Min. Restrict.   Knee AROM   R knee flexion: (140°) 120     Outcome Measures:  Other Measures  Lower Extremity Funtional Score (LEFS): 80/80    Treatments:    Therapeutic Exercise  Therapeutic Exercise Performed: Yes  Therapeutic Exercise Activity 1: Stepper L4 x 5' (bike not available)  Therapeutic Exercise Activity 2: Incline gastroc stretch x  "60\"  Therapeutic Exercise Activity 3: lat. step up x 15 4 inch  Therapeutic Exercise Activity 4: fwd step up 4 inch x 15  Therapeutic Exercise Activity 5: Startrac 45# HS 2 x 10  Therapeutic Exercise Activity 6: Startrac 25# Leg extension 2 x 10  Therapeutic Exercise Activity 7: standing hip ABD/Ext red band 2 x 10 ea. R/L  Therapeutic Exercise Activity 8: side step and retro walk red loop at ankles 12' x2 each  Therapeutic Exercise Activity 9: R TKE black band with calf raise x 20      Manual Therapy  Manual Therapy Performed: Yes  Manual Therapy Activity 1: R patellar mobs sup/inf to increase mobility  Manual Therapy Activity 2: STM, scar mobilization distal aspect of the incision area and medial hamstring to improve tissue mobility    EDUCATION:   Individual(s) Educated: Patient  Education Provided: Plan of Care- discharge to home program  Handout(s) Provided: Scanned into chart  Home Program: Continue with HEP  Risk and Benefits Discussed with Patient/Caregiver/Other: Yes   Patient/Caregiver Demonstrated Understanding: Yes   Patient Response to Education: Patient/Caregiver verbalized understanding of information, Patient/Caregiver performed return demonstration of exercises/activities, and Patient/Caregiver asked appropriate questions    Assessment:   The patient has met all her PT goals. She has full R knee strength and ROM. She is no longer using an assistive device for household or in the community. The patient is compliant with her HEP. The patient is using a reciprocal pattern with step negotiation, one rail. Her gait is normalized and no difficulty performing functional squatting. The patient is discharged to her home program today.  PT Assessment  PT Assessment Results: Decreased strength, Decreased range of motion, Decreased endurance, Impaired balance, Decreased mobility  Rehab Prognosis: Good    Plan: Discharge to home HEP.  OP PT Plan  Onset Date: 02/26/24  Certification Period Start Date: " 03/18/24  Certification Period End Date: 04/29/24  Number of Treatments Authorized: 8 of 10  Payor: MEDICARE / Plan: MEDICARE PART A AND B / Product Type: *No Product type* /     Goals:  Resolved       PT Problem       Patient will be independent with HEP.  (Met)       Start:  03/28/24    Expected End:  04/11/24    Resolved:  04/15/24         Patient will demonstrate >/= 0-110 degrees of R knee AROM to improve her ability to perform FA's. (Met)       Start:  03/28/24    Expected End:  04/18/24    Resolved:  04/15/24         Patient will demonstrate 5/5 with R knee MMT to increase her ability to negotiate curbs and steps. (Met)       Start:  03/28/24    Expected End:  04/18/24    Resolved:  04/15/24         Patient will report no >/= 2/10 pain with sleeping. (Met)       Start:  03/28/24    Expected End:  04/18/24    Resolved:  04/15/24         Patient will score >/= 64/80 on LEFS to improve her function. (Met)       Start:  03/28/24    Expected End:  04/18/24    Resolved:  04/15/24              Radha Monroy, PT

## (undated) DEVICE — SUTURE, STRATAFIX, SYMMETRIC PDS PLUS, SIZE 1, 12IN, VIOLET. CT1 36MM

## (undated) DEVICE — GLOVE, SURGICAL, PROTEXIS PI ORTHO, 8.0, PF, LF

## (undated) DEVICE — SUTURE, VICRYL, 1, 27 IN, CT-1, VIOLET

## (undated) DEVICE — DRAPE, INSTRUMENT, W/POUCH, STERI DRAPE, 7 X 11 IN, DISPOSABLE, STERILE

## (undated) DEVICE — TIP, SUCTION, FRAZIER, W/CONTROL VENT, 12 FR

## (undated) DEVICE — GLOVE, SURGICAL, PROTEXIS PI BLUE W/NEUTHERA, 8.0, PF, LF

## (undated) DEVICE — BLADE, SAW, SAGITTAL, 18.0 X 1.27 X 90MM

## (undated) DEVICE — WOUND SYSTEM, DEBRIDEMENT & CLEANING, O.R DUOPAK

## (undated) DEVICE — BANDAGE, ELASTIC, ACE, ACE, DOUBLE LENGTH, 6 X 550 IN, LF

## (undated) DEVICE — SOLUTION, INJECTION, SODIUM CHLORIDE 9%, 3000ML

## (undated) DEVICE — Device

## (undated) DEVICE — HOOD, STERISHIELD T4 SYSTEM

## (undated) DEVICE — SUTURE, MONOCRYL, 2-0, 36 IN, CT-1, UNDYED

## (undated) DEVICE — MARKER, SURGICAL, SKIN, STANDARD, W/RULER, LF

## (undated) DEVICE — BANDAGE, COFLEX, 6 X 5 YDS, TAN, STERILE, LF

## (undated) DEVICE — PAD, KNEE PATIENT, DEMAYO, DISP, STERILE

## (undated) DEVICE — DRAPE, SHEET, U, W/ADHESIVE STRIP, IMPERVIOUS, 60 X 70 IN, DISPOSABLE, LF, STERILE

## (undated) DEVICE — BLADE, RECIPROCATING, LARGE, 12.7MM

## (undated) DEVICE — SKIN CLOSURE SYS, PREMIERPRO EXOFIN, 1-4CM X 22CM, 1.75G TUBE